# Patient Record
Sex: MALE | Race: WHITE | NOT HISPANIC OR LATINO | Employment: OTHER | URBAN - NONMETROPOLITAN AREA
[De-identification: names, ages, dates, MRNs, and addresses within clinical notes are randomized per-mention and may not be internally consistent; named-entity substitution may affect disease eponyms.]

---

## 2017-01-24 ENCOUNTER — TELEPHONE (OUTPATIENT)
Dept: CARDIOLOGY | Facility: CLINIC | Age: 67
End: 2017-01-24

## 2017-03-28 RX ORDER — SIMVASTATIN 10 MG
10 TABLET ORAL DAILY
Qty: 90 TABLET | Refills: 3 | Status: SHIPPED | OUTPATIENT
Start: 2017-03-28 | End: 2018-03-29 | Stop reason: SDUPTHER

## 2017-03-28 RX ORDER — SPIRONOLACTONE 25 MG/1
25 TABLET ORAL DAILY
Qty: 90 TABLET | Refills: 3 | Status: SHIPPED | OUTPATIENT
Start: 2017-03-28 | End: 2018-06-05 | Stop reason: SDUPTHER

## 2017-08-30 ENCOUNTER — OFFICE VISIT (OUTPATIENT)
Dept: CARDIOLOGY | Facility: CLINIC | Age: 67
End: 2017-08-30

## 2017-08-30 VITALS
BODY MASS INDEX: 22.29 KG/M2 | HEIGHT: 76 IN | WEIGHT: 183 LBS | DIASTOLIC BLOOD PRESSURE: 61 MMHG | SYSTOLIC BLOOD PRESSURE: 130 MMHG | HEART RATE: 64 BPM

## 2017-08-30 DIAGNOSIS — I10 ESSENTIAL HYPERTENSION: ICD-10-CM

## 2017-08-30 DIAGNOSIS — I42.9 CARDIOMYOPATHY (HCC): Primary | ICD-10-CM

## 2017-08-30 DIAGNOSIS — I50.22 CHRONIC SYSTOLIC HEART FAILURE (HCC): ICD-10-CM

## 2017-08-30 DIAGNOSIS — I25.10 CAD IN NATIVE ARTERY: ICD-10-CM

## 2017-08-30 PROCEDURE — 93000 ELECTROCARDIOGRAM COMPLETE: CPT | Performed by: INTERNAL MEDICINE

## 2017-08-30 PROCEDURE — 99213 OFFICE O/P EST LOW 20 MIN: CPT | Performed by: INTERNAL MEDICINE

## 2017-08-30 NOTE — PROGRESS NOTES
Subjective    Carloz Aldridge is a 66 y.o. male. Fu of ihd and cmo    History of Present Illness     [facility] CMO:  Feels good now and has good stamina and is active but has learned not to over do it. Never has to increase diuretic and has no pnd or orthopnea or edema. Last LVEF 30%. Had an AICD in the past that had to be removed and he does not want another. No light-headedness or palpitations    IHD:  Good stamina without any cp and tolerates meds well.    HFrEF:  Now Class 2c. He is pleased with his level of functioning and has had no decompensation over the past year      The following portions of the patient's history were reviewed and updated as appropriate: allergies, current medications, past family history, past medical history, past social history, past surgical history and problem list.    Patient Active Problem List   Diagnosis   • Cardiomyopathy   • CAD in native artery   • Hypertension   • Chronic systolic heart failure       No Known Allergies    Family History   Problem Relation Age of Onset   • Heart disease Mother    • Hypertension Mother    • Cancer Father    • Heart disease Father    • Hypertension Father        Social History     Social History   • Marital status:      Spouse name: N/A   • Number of children: N/A   • Years of education: N/A     Occupational History   • Not on file.     Social History Main Topics   • Smoking status: Current Every Day Smoker     Packs/day: 1.00   • Smokeless tobacco: Not on file      Comment: Has tried unsuccessfully in the past to quit   • Alcohol use No   • Drug use: No   • Sexual activity: Not on file     Other Topics Concern   • Not on file     Social History Narrative         Current Outpatient Prescriptions:   •  aspirin 81 MG tablet, Take  by mouth daily., Disp: , Rfl:   •  carvedilol (COREG) 6.25 MG tablet, Take 1 tablet by mouth 2 (Two) Times a Day., Disp: 180 tablet, Rfl: 3  •  Diphenhydramine-APAP, sleep, (TYLENOL PM EXTRA STRENGTH PO), Take  by  "mouth as needed., Disp: , Rfl:   •  lisinopril (PRINIVIL,ZESTRIL) 5 MG tablet, Take 1 tablet by mouth Daily., Disp: 90 tablet, Rfl: 3  •  mometasone (NASONEX) 50 MCG/ACT nasal spray, INSTILL 2 SPRAYS INTO EACH NOSTRIL TWICE A DAY, Disp: , Rfl: 2  •  nitroglycerin (NITROSTAT) 0.4 MG SL tablet, Place 0.4 mg under the tongue every 5 (five) minutes as needed for chest pain. Take no more than 3 doses in 15 minutes., Disp: , Rfl:   •  PROAIR RESPICLICK 108 (90 BASE) MCG/ACT inhaler, TAKE 2 PUFFS EVERY 4 HOURS, Disp: , Rfl: 5  •  rOPINIRole (REQUIP) 0.5 MG tablet, Take  by mouth. Take 1 hour before bedtime., Disp: , Rfl:   •  simvastatin (ZOCOR) 10 MG tablet, Take 1 tablet by mouth Daily., Disp: 90 tablet, Rfl: 3  •  SPIRIVA RESPIMAT 2.5 MCG/ACT aerosol solution, TAKE 2 PUFFS EVERY DAY, Disp: , Rfl: 3  •  spironolactone (ALDACTONE) 25 MG tablet, Take 1 tablet by mouth Daily., Disp: 90 tablet, Rfl: 3    Past Surgical History:   Procedure Laterality Date   • CARDIAC CATHETERIZATION Right    • CATARACT EXTRACTION Bilateral    • CERVICAL DISCECTOMY ANTERIOR      C6 and 7   • CORONARY STENT PLACEMENT     • EYE SURGERY     • HERNIA REPAIR      x2   • MOUTH SURGERY     • OTHER SURGICAL HISTORY      laser surgery   • TOE SURGERY      great toe   • TONSILLECTOMY      as chile   • VASECTOMY         Review of Systems   Respiratory: Negative for apnea, chest tightness and shortness of breath.    Cardiovascular: Negative for chest pain, palpitations and leg swelling.   Gastrointestinal: Negative for abdominal pain.   Genitourinary: Negative for dysuria.   Musculoskeletal: Negative for myalgias.   Neurological: Negative for weakness and light-headedness.   Psychiatric/Behavioral: Negative for sleep disturbance.       /61 (BP Location: Left arm, Patient Position: Sitting)  Pulse 64  Ht 76\" (193 cm)  Wt 183 lb (83 kg)  BMI 22.28 kg/m2  Procedures    Objective   Physical Exam   Constitutional: He is oriented to person, place, and " time. He appears well-developed and well-nourished.   HENT:   Head: Normocephalic.   Poor dentition   Eyes: Pupils are equal, round, and reactive to light.   Cardiovascular: Normal rate, regular rhythm and intact distal pulses.  Exam reveals distant heart sounds.    Pulmonary/Chest: Effort normal and breath sounds normal. No respiratory distress. He has no wheezes. He has no rales.   Abdominal: Soft. Bowel sounds are normal. He exhibits no distension. There is no tenderness.   Musculoskeletal: He exhibits no edema.   Neurological: He is alert and oriented to person, place, and time.   Skin: Skin is warm and dry.   Psychiatric: He has a normal mood and affect.       Assessment/Plan   Carloz was seen today for cardiomyopathy, coronary artery disease and hypertension.    Diagnoses and all orders for this visit:    Cardiomyopathy  Comments:  stable  Orders:  -     ECG 12 Lead    CAD in native artery  Comments:  no angina    Essential hypertension  Comments:  controlled    Chronic systolic heart failure  Comments:  class 2c and stable                 Return in about 1 year (around 8/30/2018).  Orders Placed This Encounter   Procedures   • ECG 12 Lead     Order Specific Question:   Reason for Exam:     Answer:   Cardiomyopathy

## 2017-10-30 RX ORDER — LISINOPRIL 5 MG/1
TABLET ORAL
Qty: 90 TABLET | Refills: 3 | Status: SHIPPED | OUTPATIENT
Start: 2017-10-30 | End: 2018-11-26 | Stop reason: SDUPTHER

## 2017-10-30 RX ORDER — CARVEDILOL 6.25 MG/1
TABLET ORAL
Qty: 180 TABLET | Refills: 3 | Status: SHIPPED | OUTPATIENT
Start: 2017-10-30 | End: 2018-12-19 | Stop reason: SDUPTHER

## 2018-03-29 RX ORDER — SIMVASTATIN 10 MG
10 TABLET ORAL DAILY
Qty: 90 TABLET | Refills: 1 | Status: SHIPPED | OUTPATIENT
Start: 2018-03-29 | End: 2018-09-27 | Stop reason: SDUPTHER

## 2018-06-05 RX ORDER — SPIRONOLACTONE 25 MG/1
25 TABLET ORAL DAILY
Qty: 90 TABLET | Refills: 0 | Status: SHIPPED | OUTPATIENT
Start: 2018-06-05 | End: 2018-09-02 | Stop reason: SDUPTHER

## 2018-09-04 ENCOUNTER — OFFICE VISIT (OUTPATIENT)
Dept: CARDIOLOGY | Facility: CLINIC | Age: 68
End: 2018-09-04

## 2018-09-04 VITALS
WEIGHT: 194 LBS | HEART RATE: 72 BPM | OXYGEN SATURATION: 98 % | SYSTOLIC BLOOD PRESSURE: 120 MMHG | DIASTOLIC BLOOD PRESSURE: 66 MMHG | HEIGHT: 76 IN | BODY MASS INDEX: 23.62 KG/M2

## 2018-09-04 DIAGNOSIS — I50.22 CHRONIC SYSTOLIC HEART FAILURE (HCC): ICD-10-CM

## 2018-09-04 DIAGNOSIS — I25.5 ISCHEMIC CARDIOMYOPATHY: Primary | ICD-10-CM

## 2018-09-04 DIAGNOSIS — I25.10 CAD IN NATIVE ARTERY: ICD-10-CM

## 2018-09-04 DIAGNOSIS — E78.49 OTHER HYPERLIPIDEMIA: ICD-10-CM

## 2018-09-04 DIAGNOSIS — I10 ESSENTIAL HYPERTENSION: ICD-10-CM

## 2018-09-04 DIAGNOSIS — I73.9 PAD (PERIPHERAL ARTERY DISEASE) (HCC): ICD-10-CM

## 2018-09-04 DIAGNOSIS — F17.200 TOBACCO USE DISORDER: ICD-10-CM

## 2018-09-04 PROCEDURE — 93000 ELECTROCARDIOGRAM COMPLETE: CPT | Performed by: INTERNAL MEDICINE

## 2018-09-04 PROCEDURE — 99214 OFFICE O/P EST MOD 30 MIN: CPT | Performed by: INTERNAL MEDICINE

## 2018-09-04 RX ORDER — CLOPIDOGREL BISULFATE 75 MG/1
75 TABLET ORAL DAILY
Qty: 30 TABLET | Refills: 11 | Status: SHIPPED | OUTPATIENT
Start: 2018-09-04 | End: 2019-06-03

## 2018-09-04 RX ORDER — SPIRONOLACTONE 25 MG/1
TABLET ORAL
Qty: 90 TABLET | Refills: 3 | Status: SHIPPED | OUTPATIENT
Start: 2018-09-04 | End: 2019-09-01 | Stop reason: SDUPTHER

## 2018-09-04 NOTE — PROGRESS NOTES
"Subjective    Carloz Aldridge is a 67 y.o. male. FU of chf and ihd and risks    History of Present Illness     HFrEF:  Has slowly declining stamina with WELCH as the limiter. Also has foot pain with walking and has noted a toenail \"dropping off\". Is compliant with meds that are well tolerated and EKG is nsc. Has no edema or unusual sob. Is CL 2C and his usual activities are well tolerated.    IHD:  Is active with yard work and housework and has no cp with this amount of activity. Has some lability in his bp and on 8/15/18 at 0900 had a spell of near syncope with BP \"50/40\" and has had these spells in the past. These spells resolve quickly with laying down and there are no sequela. He has not changed meds in the past and does not usually have any problems with them.    [facility] CMO:  LVEF is < 35% but he is not interested in an AICD - he had one in the past that had to be removed d/t infection.    TOBACCO USE:  He is not interested in quitting but understands the health risk and has been counseled extensively in the recent past    HLD:  Is on a potent statin at a low dose and has not been checked \"in a while\".    The following portions of the patient's history were reviewed and updated as appropriate: allergies, current medications, past family history, past medical history, past social history, past surgical history and problem list.    Patient Active Problem List   Diagnosis   • Cardiomyopathy (CMS/HCC)   • CAD in native artery   • Hypertension   • Chronic systolic heart failure (CMS/HCC)   • PAD (peripheral artery disease) (CMS/HCC)   • Other hyperlipidemia   • Tobacco use disorder       No Known Allergies    Family History   Problem Relation Age of Onset   • Heart disease Mother    • Hypertension Mother    • Cancer Father    • Heart disease Father    • Hypertension Father        Social History     Social History   • Marital status:      Spouse name: N/A   • Number of children: N/A   • Years of education: N/A "     Occupational History   • Not on file.     Social History Main Topics   • Smoking status: Current Every Day Smoker     Packs/day: 1.00   • Smokeless tobacco: Never Used      Comment: Has tried unsuccessfully in the past to quit   • Alcohol use No   • Drug use: No   • Sexual activity: Not on file     Other Topics Concern   • Not on file     Social History Narrative   • No narrative on file         Current Outpatient Prescriptions:   •  aspirin 81 MG tablet, Take  by mouth daily., Disp: , Rfl:   •  carvedilol (COREG) 6.25 MG tablet, TAKE 1 TABLET BY MOUTH TWICE A DAY, Disp: 180 tablet, Rfl: 3  •  lisinopril (PRINIVIL,ZESTRIL) 5 MG tablet, TAKE 1 TABLET BY MOUTH DAILY., Disp: 90 tablet, Rfl: 3  •  mometasone (NASONEX) 50 MCG/ACT nasal spray, INSTILL 2 SPRAYS INTO EACH NOSTRIL TWICE A DAY, Disp: , Rfl: 2  •  nitroglycerin (NITROSTAT) 0.4 MG SL tablet, Place 0.4 mg under the tongue every 5 (five) minutes as needed for chest pain. Take no more than 3 doses in 15 minutes., Disp: , Rfl:   •  PROAIR RESPICLICK 108 (90 BASE) MCG/ACT inhaler, TAKE 2 PUFFS EVERY 4 HOURS, Disp: , Rfl: 5  •  simvastatin (ZOCOR) 10 MG tablet, TAKE 1 TABLET BY MOUTH DAILY., Disp: 90 tablet, Rfl: 1  •  SPIRIVA RESPIMAT 2.5 MCG/ACT aerosol solution, TAKE 2 PUFFS EVERY DAY, Disp: , Rfl: 3  •  spironolactone (ALDACTONE) 25 MG tablet, TAKE 1 TABLET BY MOUTH DAILY., Disp: 90 tablet, Rfl: 0  •  clopidogrel (PLAVIX) 75 MG tablet, Take 1 tablet by mouth Daily., Disp: 30 tablet, Rfl: 11  •  Diphenhydramine-APAP, sleep, (TYLENOL PM EXTRA STRENGTH PO), Take  by mouth as needed., Disp: , Rfl:     Past Surgical History:   Procedure Laterality Date   • CARDIAC CATHETERIZATION Right    • CATARACT EXTRACTION Bilateral    • CERVICAL DISCECTOMY ANTERIOR      C6 and 7   • CORONARY STENT PLACEMENT     • EYE SURGERY     • HERNIA REPAIR      x2   • MOUTH SURGERY     • OTHER SURGICAL HISTORY      laser surgery   • TOE SURGERY      great toe   • TONSILLECTOMY      as  "chile   • VASECTOMY         Review of Systems   Constitutional: Negative for fatigue, fever and unexpected weight change.   Respiratory: Positive for shortness of breath. Negative for apnea and chest tightness.    Cardiovascular: Negative for chest pain, palpitations and leg swelling.   Gastrointestinal: Negative for abdominal pain.   Genitourinary: Negative for dysuria.   Musculoskeletal: Positive for arthralgias. Negative for myalgias.   Neurological: Negative for weakness and light-headedness.   Psychiatric/Behavioral: Negative for sleep disturbance.       /66 (BP Location: Left arm, Patient Position: Sitting, Cuff Size: Adult)   Pulse 72   Ht 193 cm (75.98\")   Wt 88 kg (194 lb)   SpO2 98%   BMI 23.62 kg/m²   Procedures    Objective   Physical Exam   Constitutional: He is oriented to person, place, and time. He appears well-developed and well-nourished. No distress.   HENT:   Head: Normocephalic.   Eyes: Pupils are equal, round, and reactive to light.   Neck: No thyromegaly present.   Cardiovascular: Normal rate and regular rhythm.  Exam reveals distant heart sounds. Exam reveals no gallop and no friction rub.    No murmur heard.  Pulses:       Dorsalis pedis pulses are 0 on the right side, and 0 on the left side.        Posterior tibial pulses are 0 on the right side, and 0 on the left side.   KLARISSA<0.75 BILAT   Pulmonary/Chest: Effort normal and breath sounds normal. No respiratory distress. He has no wheezes. He has no rales.   Abdominal: Soft. Bowel sounds are normal. He exhibits no distension. There is no tenderness. There is no guarding.   Musculoskeletal: He exhibits no edema or tenderness.   Neurological: He is alert and oriented to person, place, and time.   Skin: Skin is warm and dry. He is not diaphoretic.   Psychiatric: He has a normal mood and affect.       Assessment/Plan   Carloz was seen today for cardiomyopathy, edema, congestive heart failure, dizziness, fatigue and " hypotension.    Diagnoses and all orders for this visit:    Ischemic cardiomyopathy  Comments:  CL 2C CHF  - STABLE. REFUSES TO CONSIDER ANOTHER AICD    CAD in native artery  Comments:  NO ANGINA  Orders:  -     ECG 12 Lead    Essential hypertension  Comments:  LABILE READINGS WITH SOME SYMPTOMATIC HYPOTENSION    Chronic systolic heart failure (CMS/HCC)    PAD (peripheral artery disease) (CMS/HCC)  Comments:  BILAT FOOT PAIN WITH VERY LOW KLARISSA's BILAT - CHECK BILAT ART DUPLEX  Orders:  -     US Arterial Doppler Lower Extremity Complete  -     clopidogrel (PLAVIX) 75 MG tablet; Take 1 tablet by mouth Daily.    Other hyperlipidemia  Comments:  ON LOW DOSE POTENT STATIN AND HAS NOT BEEN CHECKED RECENTLY  Orders:  -     Lipid Panel; Future    Tobacco use disorder  Comments:  IS NOT INTERESTED IN QUITTING OR MORE COUNSELING AT THIS TIME                 Return in about 6 months (around 3/4/2019), or WITH [facility].  Orders Placed This Encounter   Procedures   • US Arterial Doppler Lower Extremity Complete     Order Specific Question:   Reason for Exam:     Answer:   CLAUDICATION   • Lipid Panel     Standing Status:   Future     Standing Expiration Date:   9/4/2019   • ECG 12 Lead     Order Specific Question:   Reason for Exam:     Answer:   CHF

## 2018-09-10 ENCOUNTER — HOSPITAL ENCOUNTER (OUTPATIENT)
Dept: ULTRASOUND IMAGING | Facility: HOSPITAL | Age: 68
Discharge: HOME | End: 2018-09-10
Attending: INTERNAL MEDICINE | Admitting: INTERNAL MEDICINE

## 2018-09-10 PROCEDURE — 93925 LOWER EXTREMITY STUDY: CPT | Performed by: SURGERY

## 2018-09-10 PROCEDURE — 93925 LOWER EXTREMITY STUDY: CPT

## 2018-09-28 RX ORDER — SIMVASTATIN 10 MG
10 TABLET ORAL DAILY
Qty: 90 TABLET | Refills: 3 | Status: SHIPPED | OUTPATIENT
Start: 2018-09-28 | End: 2019-06-06

## 2018-11-27 RX ORDER — LISINOPRIL 5 MG/1
5 TABLET ORAL DAILY
Qty: 90 TABLET | Refills: 3 | Status: SHIPPED | OUTPATIENT
Start: 2018-11-27 | End: 2019-12-01 | Stop reason: SDUPTHER

## 2018-12-19 RX ORDER — CARVEDILOL 6.25 MG/1
TABLET ORAL
Qty: 180 TABLET | Refills: 2 | Status: SHIPPED | OUTPATIENT
Start: 2018-12-19 | End: 2019-10-24 | Stop reason: SDUPTHER

## 2019-06-03 ENCOUNTER — OFFICE VISIT (OUTPATIENT)
Dept: CARDIOLOGY | Facility: CLINIC | Age: 69
End: 2019-06-03

## 2019-06-03 VITALS
WEIGHT: 188 LBS | HEIGHT: 75 IN | SYSTOLIC BLOOD PRESSURE: 121 MMHG | BODY MASS INDEX: 23.38 KG/M2 | DIASTOLIC BLOOD PRESSURE: 60 MMHG | HEART RATE: 72 BPM

## 2019-06-03 DIAGNOSIS — E78.49 OTHER HYPERLIPIDEMIA: ICD-10-CM

## 2019-06-03 DIAGNOSIS — I25.10 CAD IN NATIVE ARTERY: ICD-10-CM

## 2019-06-03 DIAGNOSIS — F17.200 TOBACCO USE DISORDER: ICD-10-CM

## 2019-06-03 DIAGNOSIS — I25.5 ISCHEMIC CARDIOMYOPATHY: ICD-10-CM

## 2019-06-03 DIAGNOSIS — I10 ESSENTIAL HYPERTENSION: ICD-10-CM

## 2019-06-03 DIAGNOSIS — I50.22 CHRONIC SYSTOLIC HEART FAILURE (HCC): Primary | ICD-10-CM

## 2019-06-03 PROCEDURE — 93000 ELECTROCARDIOGRAM COMPLETE: CPT | Performed by: NURSE PRACTITIONER

## 2019-06-03 PROCEDURE — 99406 BEHAV CHNG SMOKING 3-10 MIN: CPT | Performed by: NURSE PRACTITIONER

## 2019-06-03 PROCEDURE — 99214 OFFICE O/P EST MOD 30 MIN: CPT | Performed by: NURSE PRACTITIONER

## 2019-06-03 NOTE — PROGRESS NOTES
Subjective:     Encounter Date:06/03/2019      Patient ID: Carloz Aldridge is a 68 y.o. male.    Chief Complaint:  Coronary Artery Disease   Presents for follow-up visit. Symptoms include leg swelling (occasional ) and shortness of breath. Pertinent negatives include no chest pain, chest pressure, chest tightness, dizziness, muscle weakness, palpitations or weight gain. His past medical history is significant for CHF. The symptoms have been stable. Compliance with diet is good. Compliance with exercise is good. Compliance with medications is good.   Congestive Heart Failure   Presents for follow-up visit. Associated symptoms include shortness of breath. Pertinent negatives include no abdominal pain, chest pain, chest pressure, claudication, edema, fatigue, muscle weakness, near-syncope or palpitations. The symptoms have been stable. His past medical history is significant for CAD. Compliance with total regimen is %. Compliance with diet is 51-75%. Compliance with exercise is 51-75%. Compliance with medications is %.     Patient is here for routine follow for ischemic cardiomyopathy, systolic congestive heart failure, coronary artery disease with remote history of MI, tobacco use and hyperlipidemia. Patient also has a history of AICD infection that was removed. Patient has no interest in having an additional AICD placed- we discussed benefits and reason for AICD and he acknowledges understanding. Patient continues to smoke 1 ppd but is down from 3.5 ppd. Patient follows with Dr. Chakraborty as his PCP. He states he is active without limitation. He does have some chronic shortness of breath- but unchanged. Denies chest pain. Occasional swelling on right leg- comes and goes. Patient at last OV had ultrasound of legs and was started on Plavix- however ultrasound of leg was normal. He states he could not tolerate Plavix so he quit taking.   The following portions of the patient's history were reviewed and  updated as appropriate: allergies, current medications, past family history, past medical history, past social history, past surgical history and problem list.   Prior to Admission medications    Medication Sig Start Date End Date Taking? Authorizing Provider   aspirin 81 MG tablet Take  by mouth daily.   Yes Alvaro Alvarado MD   carvedilol (COREG) 6.25 MG tablet TAKE 1 TABLET BY MOUTH TWICE A DAY 12/19/18  Yes Hector Canales MD   Diphenhydramine-APAP, sleep, (TYLENOL PM EXTRA STRENGTH PO) Take  by mouth as needed.   Yes Alvaro Alvarado MD   lisinopril (PRINIVIL,ZESTRIL) 5 MG tablet Take 1 tablet by mouth Daily. 11/27/18  Yes Hector Canales MD   mometasone (NASONEX) 50 MCG/ACT nasal spray INSTILL 2 SPRAYS INTO EACH NOSTRIL TWICE A DAY 8/13/16  Yes Alvaro Alvarado MD   nitroglycerin (NITROSTAT) 0.4 MG SL tablet Place 0.4 mg under the tongue every 5 (five) minutes as needed for chest pain. Take no more than 3 doses in 15 minutes.   Yes Alvaro Alvarado MD   PROAIR RESPICLICK 108 (90 BASE) MCG/ACT inhaler TAKE 2 PUFFS EVERY 4 HOURS 8/13/16  Yes Alvaro Alvarado MD   simvastatin (ZOCOR) 10 MG tablet TAKE 1 TABLET BY MOUTH DAILY. 9/28/18  Yes Hector Canales MD   SPIRIVA RESPIMAT 2.5 MCG/ACT aerosol solution TAKE 2 PUFFS EVERY DAY 8/13/16  Yes Alvaro Alvarado MD   spironolactone (ALDACTONE) 25 MG tablet TAKE 1 TABLET BY MOUTH EVERY DAY 9/4/18  Yes Hector Canales MD   clopidogrel (PLAVIX) 75 MG tablet Take 1 tablet by mouth Daily. 9/4/18 6/3/19 Yes Hector Canales MD     Past Medical History:   Diagnosis Date   • CAD in native artery    • Cardiomyopathy (CMS/HCC)     Story: 9/14- EF 20-25% WORSENED LVEF AFTER COMING OFF MEDS (SELF-DIRECTED) 2/14-LVEF BELOW 35%; 4/2015 EF 30% Pt had an AICD in the past, which was removed due to infection.   • Chronic systolic heart failure (CMS/HCC)     Story: LVEF 30% AICD removed 2011 d/t infection. Per   Ally risk of infection too high to reimplant AICD.   • COPD (chronic obstructive pulmonary disease) (CMS/[facility])    • Dyspnea, paroxysmal nocturnal    • Erectile dysfunction    • Hyperlipidemia, mild    • Hypertension    • Non compliance with medical treatment    • Tobacco use disorder    • V tach (CMS/[facility])        Review of Systems   Constitution: Negative for chills, decreased appetite, fatigue, fever, malaise/fatigue, weight gain and weight loss.   HENT: Negative for nosebleeds.    Eyes: Negative for visual disturbance.   Cardiovascular: Positive for leg swelling (occasional ). Negative for chest pain, claudication, dyspnea on exertion, near-syncope, orthopnea, palpitations, paroxysmal nocturnal dyspnea and syncope.   Respiratory: Positive for shortness of breath. Negative for chest tightness, cough, hemoptysis and snoring.    Endocrine: Negative for cold intolerance and heat intolerance.   Hematologic/Lymphatic: Negative for bleeding problem. Does not bruise/bleed easily.   Skin: Negative for rash.   Musculoskeletal: Negative for back pain, falls and muscle weakness.   Gastrointestinal: Negative for abdominal pain, constipation, diarrhea, heartburn, melena, nausea and vomiting.   Genitourinary: Negative for hematuria.   Neurological: Negative for dizziness, headaches and light-headedness.   Psychiatric/Behavioral: Negative for altered mental status.   Allergic/Immunologic: Negative for persistent infections.         ECG 12 Lead  Date/Time: 6/3/2019 2:15 PM  Performed by: Bradford Camargo APRN  Authorized by: Bradford Camargo APRN   Comparison: compared with previous ECG from 9/4/2018  Similar to previous ECG  Rhythm: sinus rhythm  Conduction: 1st degree AV block and non-specific intraventricular conduction delay               Objective:     Physical Exam   Constitutional: He is oriented to person, place, and time. He appears well-developed and well-nourished.   HENT:   Head: Normocephalic and atraumatic.   Eyes:  "Pupils are equal, round, and reactive to light.   Neck: Normal range of motion. Neck supple. No JVD present. Carotid bruit is not present.   Cardiovascular: Normal rate, regular rhythm, normal heart sounds and intact distal pulses.   Pulmonary/Chest: Effort normal. He has wheezes.   Abdominal: Soft. Bowel sounds are normal.   Musculoskeletal: Normal range of motion. He exhibits no edema.   Neurological: He is alert and oriented to person, place, and time. He has normal reflexes.   Skin: Skin is warm and dry.   Psychiatric: He has a normal mood and affect. His behavior is normal. Judgment and thought content normal.     Blood pressure 121/60, pulse 72, height 190.5 cm (75\"), weight 85.3 kg (188 lb).      Lab Review:       Assessment:          Diagnosis Plan   1. Chronic systolic heart failure (CMS/HCC)     2. Ischemic cardiomyopathy     3. CAD in native artery     4. Essential hypertension     5. Other hyperlipidemia     6. Tobacco use disorder            Plan:       1. Chronic systolic congestive heart failure- last EF 30% on echo in 2015. Class II symptoms. ACE and Beta Blocker. Reports blood pressure runs too low at home. Low Salt Diet. Daily Weights.   2. Ischemic Cardiomyopathy- EF 30%- long history of reduced EF. AICD removed due to infection. Patient does not want AICD  3. Coronary Artery Disease- history of MI and stent. On statin, aspirin, BB and ACe. No clinical evidence of ischemia  4. BLood Pressure well controlled- reports normally runs systolic 100  5. Hyperlipidemia- on statin followed by PCP. Obtain last lipid panel from PCP  6. I advised Carloz of the risks of continuing to use tobacco, and I provided him with tobacco cessation educational materials in the After Visit Summary. Down from 3.5 ppd to 1 ppd    During this visit, I spent 3-5 minutes counseling the patient regarding tobacco cessation.         "

## 2019-06-06 ENCOUNTER — TELEPHONE (OUTPATIENT)
Dept: CARDIOLOGY | Facility: CLINIC | Age: 69
End: 2019-06-06

## 2019-06-06 RX ORDER — ATORVASTATIN CALCIUM 40 MG/1
40 TABLET, FILM COATED ORAL DAILY
Qty: 30 TABLET | Refills: 11 | Status: SHIPPED | OUTPATIENT
Start: 2019-06-06 | End: 2020-06-01

## 2019-06-06 NOTE — TELEPHONE ENCOUNTER
PATIENT HAS BEEN NOTIFIED. HE WAS CONFUSED SOME THINKING HE WAS ALREADY ON LIPITOR. I TOLD HIM OUR RECORDS SHOW HE WAS ON ZOCOR. HE VERBALIZED UNDERSTANDING AT THE END OF THE PHONE CALL.  ----- Message from SHILPA William sent at 6/6/2019  3:25 PM CDT -----  Please let patient know cholesterol is high and not at goal. Stop his statin and I am starting Lipitor.   ----- Message -----  From: Sheree Mujica MA  Sent: 6/5/2019   4:12 PM  To: SHILPA William    SCANNED UNDER MEDIA  ----- Message -----  From: Bradford Camargo APRN  Sent: 6/3/2019   2:15 PM  To: Sheree Mujica MA    Please obtain last lipid panel from Dr. Mylene diaz

## 2019-09-03 RX ORDER — SPIRONOLACTONE 25 MG/1
TABLET ORAL
Qty: 90 TABLET | Refills: 3 | Status: SHIPPED | OUTPATIENT
Start: 2019-09-03 | End: 2020-10-05

## 2019-10-24 RX ORDER — CARVEDILOL 6.25 MG/1
TABLET ORAL
Qty: 180 TABLET | Refills: 2 | Status: SHIPPED | OUTPATIENT
Start: 2019-10-24 | End: 2020-12-22 | Stop reason: SDUPTHER

## 2019-12-02 RX ORDER — LISINOPRIL 5 MG/1
TABLET ORAL
Qty: 90 TABLET | Refills: 3 | Status: SHIPPED | OUTPATIENT
Start: 2019-12-02 | End: 2020-12-22

## 2020-01-15 ENCOUNTER — OFFICE VISIT (OUTPATIENT)
Dept: CARDIOLOGY | Facility: CLINIC | Age: 70
End: 2020-01-15

## 2020-01-15 VITALS
HEART RATE: 81 BPM | SYSTOLIC BLOOD PRESSURE: 130 MMHG | HEIGHT: 75 IN | OXYGEN SATURATION: 99 % | BODY MASS INDEX: 23.25 KG/M2 | WEIGHT: 187 LBS | DIASTOLIC BLOOD PRESSURE: 82 MMHG

## 2020-01-15 DIAGNOSIS — I10 ESSENTIAL HYPERTENSION: ICD-10-CM

## 2020-01-15 DIAGNOSIS — E78.49 OTHER HYPERLIPIDEMIA: ICD-10-CM

## 2020-01-15 DIAGNOSIS — I50.22 CHRONIC SYSTOLIC HEART FAILURE (HCC): ICD-10-CM

## 2020-01-15 DIAGNOSIS — I35.1 AORTIC VALVE INSUFFICIENCY, ETIOLOGY OF CARDIAC VALVE DISEASE UNSPECIFIED: ICD-10-CM

## 2020-01-15 DIAGNOSIS — I25.10 CORONARY ARTERY DISEASE INVOLVING NATIVE CORONARY ARTERY OF NATIVE HEART WITHOUT ANGINA PECTORIS: ICD-10-CM

## 2020-01-15 DIAGNOSIS — I42.0 CARDIOMYOPATHY, DILATED (HCC): Primary | ICD-10-CM

## 2020-01-15 DIAGNOSIS — F17.200 TOBACCO USE DISORDER: ICD-10-CM

## 2020-01-15 PROCEDURE — 93000 ELECTROCARDIOGRAM COMPLETE: CPT | Performed by: NURSE PRACTITIONER

## 2020-01-15 PROCEDURE — 99214 OFFICE O/P EST MOD 30 MIN: CPT | Performed by: NURSE PRACTITIONER

## 2020-01-20 ENCOUNTER — APPOINTMENT (OUTPATIENT)
Dept: CARDIOLOGY | Facility: HOSPITAL | Age: 70
End: 2020-01-20

## 2020-01-28 ENCOUNTER — HOSPITAL ENCOUNTER (OUTPATIENT)
Dept: CARDIOLOGY | Facility: HOSPITAL | Age: 70
Discharge: HOME | End: 2020-01-28
Admitting: NURSE PRACTITIONER

## 2020-01-28 VITALS
WEIGHT: 186.95 LBS | DIASTOLIC BLOOD PRESSURE: 63 MMHG | BODY MASS INDEX: 23.24 KG/M2 | SYSTOLIC BLOOD PRESSURE: 128 MMHG | HEIGHT: 75 IN

## 2020-01-28 DIAGNOSIS — I35.1 AORTIC VALVE INSUFFICIENCY, ETIOLOGY OF CARDIAC VALVE DISEASE UNSPECIFIED: ICD-10-CM

## 2020-01-28 DIAGNOSIS — I42.0 CARDIOMYOPATHY, DILATED (HCC): ICD-10-CM

## 2020-01-28 PROCEDURE — 93356 MYOCRD STRAIN IMG SPCKL TRCK: CPT

## 2020-01-28 PROCEDURE — 93356 MYOCRD STRAIN IMG SPCKL TRCK: CPT | Performed by: INTERNAL MEDICINE

## 2020-01-28 PROCEDURE — 93306 TTE W/DOPPLER COMPLETE: CPT | Performed by: INTERNAL MEDICINE

## 2020-01-28 PROCEDURE — 93306 TTE W/DOPPLER COMPLETE: CPT

## 2020-01-29 LAB
BH CV ECHO MEAS - AO MAX PG (FULL): 5.3 MMHG
BH CV ECHO MEAS - AO MAX PG: 9.9 MMHG
BH CV ECHO MEAS - AO MEAN PG (FULL): 3 MMHG
BH CV ECHO MEAS - AO MEAN PG: 5 MMHG
BH CV ECHO MEAS - AO ROOT AREA (BSA CORRECTED): 1.7
BH CV ECHO MEAS - AO ROOT AREA: 10.2 CM^2
BH CV ECHO MEAS - AO ROOT DIAM: 3.6 CM
BH CV ECHO MEAS - AO V2 MAX: 157 CM/SEC
BH CV ECHO MEAS - AO V2 MEAN: 97.1 CM/SEC
BH CV ECHO MEAS - AO V2 VTI: 31 CM
BH CV ECHO MEAS - AVA(I,A): 3.6 CM^2
BH CV ECHO MEAS - AVA(I,D): 3.6 CM^2
BH CV ECHO MEAS - AVA(V,A): 3.3 CM^2
BH CV ECHO MEAS - AVA(V,D): 3.3 CM^2
BH CV ECHO MEAS - BSA(HAYCOCK): 2.1 M^2
BH CV ECHO MEAS - BSA: 2.1 M^2
BH CV ECHO MEAS - BZI_BMI: 23.4 KILOGRAMS/M^2
BH CV ECHO MEAS - BZI_METRIC_HEIGHT: 190.5 CM
BH CV ECHO MEAS - BZI_METRIC_WEIGHT: 84.8 KG
BH CV ECHO MEAS - EDV(CUBED): 428.7 ML
BH CV ECHO MEAS - EDV(MOD-SP4): 217 ML
BH CV ECHO MEAS - EDV(TEICH): 301.9 ML
BH CV ECHO MEAS - EF(CUBED): 37.4 %
BH CV ECHO MEAS - EF(MOD-SP4): 32.7 %
BH CV ECHO MEAS - EF(TEICH): 29.7 %
BH CV ECHO MEAS - ESV(CUBED): 268.3 ML
BH CV ECHO MEAS - ESV(MOD-SP4): 146 ML
BH CV ECHO MEAS - ESV(TEICH): 212.2 ML
BH CV ECHO MEAS - FS: 14.5 %
BH CV ECHO MEAS - IVS/LVPW: 1
BH CV ECHO MEAS - IVSD: 0.97 CM
BH CV ECHO MEAS - LA DIMENSION: 4.5 CM
BH CV ECHO MEAS - LA/AO: 1.3
BH CV ECHO MEAS - LAT PEAK E' VEL: 3.5 CM/SEC
BH CV ECHO MEAS - LV DIASTOLIC VOL/BSA (35-75): 101.8 ML/M^2
BH CV ECHO MEAS - LV MASS(C)D: 343.2 GRAMS
BH CV ECHO MEAS - LV MASS(C)DI: 160.9 GRAMS/M^2
BH CV ECHO MEAS - LV MAX PG: 4.6 MMHG
BH CV ECHO MEAS - LV MEAN PG: 2 MMHG
BH CV ECHO MEAS - LV SYSTOLIC VOL/BSA (12-30): 68.5 ML/M^2
BH CV ECHO MEAS - LV V1 MAX: 107 CM/SEC
BH CV ECHO MEAS - LV V1 MEAN: 70.6 CM/SEC
BH CV ECHO MEAS - LV V1 VTI: 22.6 CM
BH CV ECHO MEAS - LVIDD: 7.5 CM
BH CV ECHO MEAS - LVIDS: 6.5 CM
BH CV ECHO MEAS - LVLD AP4: 10.6 CM
BH CV ECHO MEAS - LVLS AP4: 9.9 CM
BH CV ECHO MEAS - LVOT AREA (M): 4.9 CM^2
BH CV ECHO MEAS - LVOT AREA: 4.9 CM^2
BH CV ECHO MEAS - LVOT DIAM: 2.5 CM
BH CV ECHO MEAS - LVPWD: 0.93 CM
BH CV ECHO MEAS - MED PEAK E' VEL: 2.61 CM/SEC
BH CV ECHO MEAS - MV A MAX VEL: 126 CM/SEC
BH CV ECHO MEAS - MV DEC TIME: 0.14 SEC
BH CV ECHO MEAS - MV E MAX VEL: 109 CM/SEC
BH CV ECHO MEAS - MV E/A: 0.87
BH CV ECHO MEAS - SI(AO): 148 ML/M^2
BH CV ECHO MEAS - SI(CUBED): 75.2 ML/M^2
BH CV ECHO MEAS - SI(LVOT): 52 ML/M^2
BH CV ECHO MEAS - SI(MOD-SP4): 33.3 ML/M^2
BH CV ECHO MEAS - SI(TEICH): 42 ML/M^2
BH CV ECHO MEAS - SV(AO): 315.5 ML
BH CV ECHO MEAS - SV(CUBED): 160.3 ML
BH CV ECHO MEAS - SV(LVOT): 110.9 ML
BH CV ECHO MEAS - SV(MOD-SP4): 71 ML
BH CV ECHO MEAS - SV(TEICH): 89.6 ML
BH CV ECHO MEASUREMENTS AVERAGE E/E' RATIO: 35.68
LEFT ATRIUM VOLUME INDEX: 35.9 ML/M2
LEFT ATRIUM VOLUME: 76.5 CM3
MAXIMAL PREDICTED HEART RATE: 153 BPM
STRESS TARGET HR: 130 BPM

## 2020-06-01 RX ORDER — ATORVASTATIN CALCIUM 40 MG/1
TABLET, FILM COATED ORAL
Qty: 90 TABLET | Refills: 3 | Status: SHIPPED | OUTPATIENT
Start: 2020-06-01 | End: 2021-10-08 | Stop reason: SDUPTHER

## 2020-10-05 RX ORDER — SPIRONOLACTONE 25 MG/1
TABLET ORAL
Qty: 90 TABLET | Refills: 3 | Status: SHIPPED | OUTPATIENT
Start: 2020-10-05 | End: 2021-12-08

## 2020-10-21 NOTE — PROGRESS NOTES
"    Subjective:     Encounter Date:01/15/2020      Patient ID: Carloz Aldridge is a 69 y.o. male.    Chief Complaint: follow up CAD and CHF   The patient presents today to follow up up regarding his chronic systolic congestive heart failure/ischemic cardiomyopathy and CAD with history of remote MI. He previously had an ICD. However, this was explanted due to infection and he declined reimplantation. He continues to smoke and has COPD. He reports chronic and unchanged dyspnea on exertion and orthopnea. He states he has slept in his recliner for years, partially due to orthopnea and partially due to back pain. He states he has occasional swelling in hs right foot, but denies edema otherwise. He was previously placed on plavix for presumed PAD based on abnormal ABIs. However his bilateral lower extremity duplex was negative for any significant arterial stenosis and he quit taking plavix. He denies chest pain, PND, palpitations, syncope or pre syncope. He reports he is compliant with his medications with the exception of often missing his morning dose of coreg. He states his SBP is typically in the 110s.       The following portions of the patient's history were reviewed and updated as appropriate: allergies, current medications, past family history, past medical history, past social history, past surgical history and problem list.  /82   Pulse 81   Ht 190.5 cm (75\")   Wt 84.8 kg (187 lb)   SpO2 99%   BMI 23.37 kg/m²   No Known Allergies    Current Outpatient Medications:   •  aspirin 81 MG tablet, Take  by mouth daily., Disp: , Rfl:   •  atorvastatin (LIPITOR) 40 MG tablet, Take 1 tablet by mouth Daily., Disp: 30 tablet, Rfl: 11  •  carvedilol (COREG) 6.25 MG tablet, TAKE 1 TABLET BY MOUTH TWICE A DAY, Disp: 180 tablet, Rfl: 2  •  Diphenhydramine-APAP, sleep, (TYLENOL PM EXTRA STRENGTH PO), Take  by mouth as needed., Disp: , Rfl:   •  lisinopril (PRINIVIL,ZESTRIL) 5 MG tablet, TAKE 1 TABLET BY MOUTH EVERY " I spoke with pt. appt scheduled.    DAY, Disp: 90 tablet, Rfl: 3  •  mometasone (NASONEX) 50 MCG/ACT nasal spray, INSTILL 2 SPRAYS INTO EACH NOSTRIL TWICE A DAY, Disp: , Rfl: 2  •  nitroglycerin (NITROSTAT) 0.4 MG SL tablet, Place 0.4 mg under the tongue every 5 (five) minutes as needed for chest pain. Take no more than 3 doses in 15 minutes., Disp: , Rfl:   •  PROAIR RESPICLICK 108 (90 BASE) MCG/ACT inhaler, TAKE 2 PUFFS EVERY 4 HOURS, Disp: , Rfl: 5  •  SPIRIVA RESPIMAT 2.5 MCG/ACT aerosol solution, TAKE 2 PUFFS EVERY DAY, Disp: , Rfl: 3  •  spironolactone (ALDACTONE) 25 MG tablet, TAKE 1 TABLET BY MOUTH EVERY DAY, Disp: 90 tablet, Rfl: 3  Past Medical History:   Diagnosis Date   • CAD in native artery    • Cardiomyopathy (CMS/HCC)     Story: 9/14- EF 20-25% WORSENED LVEF AFTER COMING OFF MEDS (SELF-DIRECTED) 2/14-LVEF BELOW 35%; 4/2015 EF 30% Pt had an AICD in the past, which was removed due to infection.   • Chronic systolic heart failure (CMS/HCC)     Story: LVEF 30% AICD removed 2011 d/t infection. Per Dr. Canales risk of infection too high to reimplant AICD.   • COPD (chronic obstructive pulmonary disease) (CMS/HCC)    • Dyspnea, paroxysmal nocturnal    • Erectile dysfunction    • Hyperlipidemia, mild    • Hypertension    • Non compliance with medical treatment    • Tobacco use disorder    • V tach (CMS/HCC)      Past Surgical History:   Procedure Laterality Date   • CARDIAC CATHETERIZATION Right    • CATARACT EXTRACTION Bilateral    • CERVICAL DISCECTOMY ANTERIOR      C6 and 7   • CORONARY STENT PLACEMENT     • EYE SURGERY     • HERNIA REPAIR      x2   • MOUTH SURGERY     • OTHER SURGICAL HISTORY      laser surgery   • TOE SURGERY      great toe   • TONSILLECTOMY      as chile   • VASECTOMY       Social History     Socioeconomic History   • Marital status:      Spouse name: Not on file   • Number of children: Not on file   • Years of education: Not on file   • Highest education level: Not on file   Tobacco Use   • Smoking status:  Current Every Day Smoker     Packs/day: 1.00   • Smokeless tobacco: Never Used   • Tobacco comment: Has tried unsuccessfully in the past to quit   Substance and Sexual Activity   • Alcohol use: No   • Drug use: No   • Sexual activity: Defer     Family History   Problem Relation Age of Onset   • Heart disease Mother    • Hypertension Mother    • Cancer Father    • Heart disease Father    • Hypertension Father        Review of Systems   Constitution: Negative for chills, diaphoresis and fever.   HENT: Negative for nosebleeds.    Eyes: Negative for visual disturbance.   Cardiovascular: Positive for dyspnea on exertion and orthopnea. Negative for chest pain, claudication, cyanosis, irregular heartbeat, leg swelling, near-syncope, palpitations, paroxysmal nocturnal dyspnea and syncope.   Respiratory: Negative for cough, hemoptysis, shortness of breath, sputum production and wheezing.    Hematologic/Lymphatic: Negative for bleeding problem.   Skin: Negative for color change and flushing.   Musculoskeletal: Negative for falls.   Gastrointestinal: Negative for bloating, abdominal pain, hematemesis, hematochezia, melena, nausea and vomiting.   Genitourinary: Negative for hematuria.   Neurological: Negative for dizziness, light-headedness and weakness.   Psychiatric/Behavioral: Negative for altered mental status.         ECG 12 Lead  Date/Time: 1/15/2020 4:12 PM  Performed by: Camille Castro APRN  Authorized by: Camille Castro APRN   Comparison: compared with previous ECG from 6/3/2019  Similar to previous ECG  Rhythm: sinus rhythm  BPM: 81  Conduction: right bundle branch block and 1st degree AV block               Objective:     Physical Exam   Constitutional: He is oriented to person, place, and time. He appears well-developed and well-nourished. No distress.   HENT:   Head: Normocephalic and atraumatic.   Eyes: Pupils are equal, round, and reactive to light.   Neck: Normal range of motion. Neck supple. No JVD present. No  thyromegaly present.   Cardiovascular: Normal rate, regular rhythm, normal heart sounds and intact distal pulses. Exam reveals no gallop and no friction rub.   No murmur heard.  Distant heart tones    Pulmonary/Chest: Effort normal. No respiratory distress. He has decreased breath sounds. He has no wheezes. He has rhonchi. He has no rales. He exhibits no tenderness.   Abdominal: Soft. Bowel sounds are normal. He exhibits no distension. There is no tenderness.   Musculoskeletal: Normal range of motion. He exhibits no edema.   Neurological: He is alert and oriented to person, place, and time. No cranial nerve deficit.   Skin: Skin is warm and dry. He is not diaphoretic.   Psychiatric: He has a normal mood and affect. His behavior is normal.           Assessment:          Diagnosis Plan   1. Cardiomyopathy, dilated (CMS/HCC)    Ischemic    2. Aortic valve insufficiency, etiology of cardiac valve disease unspecified    Moderate AI per echo almost 5 years ago - repeat echo for continued monitoring  Stable clinically      3. Chronic systolic heart failure (CMS/HCC)    Stage C, Class II-III  LVEF 30% by 4/2015 echo  See notes in HPI - he declines reimplantation of an ICD  Compensated/euvolemic      4. Essential hypertension    Controlled    5. Other hyperlipidemia    Continue high intensity statin - he was transitioned from zocor to Lipitor 6 months ago because his LDL was 88 - not at goal - will request a more recent lipid panel from pcp      6. Tobacco use disorder  Carloz Aldridge  reports that he has been smoking. He has been smoking about 1.00 pack per day. He has never used smokeless tobacco.. I have educated him on the risk of diseases from using tobacco products such as cancer, COPD and heart diease.     I advised him to quit and he is willing to quit. We have discussed the following method/s for tobacco cessation:  Counseling.      I spent 4 minutes counseling the patient.          7. CAD - Hx of remote MI s/p PCI  per pt report. Stable, no angina. Will request previous cath report      Plan:       As noted above  2d echo   Continue ASA, atorvastatin, coreg, lisinopril, aldactone PRN SL NTG  Follow up 6 months, sooner with symptoms or concerns     Reviewed signs and symptoms of CHF and what to report with the patient. Patient instructed to restrict sodium and weigh daily. Report weight gain of greater than 2 lbs overnight or 5 lbs in 1 week. Pt verbalized understanding of instructions and plan of care.

## 2020-12-22 RX ORDER — CARVEDILOL 6.25 MG/1
6.25 TABLET ORAL 2 TIMES DAILY
Qty: 180 TABLET | Refills: 4 | Status: SHIPPED | OUTPATIENT
Start: 2020-12-22 | End: 2022-07-06

## 2020-12-22 RX ORDER — LISINOPRIL 5 MG/1
TABLET ORAL
Qty: 90 TABLET | Refills: 3 | Status: SHIPPED | OUTPATIENT
Start: 2020-12-22 | End: 2022-02-14

## 2021-06-11 RX ORDER — ATORVASTATIN CALCIUM 40 MG/1
TABLET, FILM COATED ORAL
Qty: 90 TABLET | Refills: 3 | OUTPATIENT
Start: 2021-06-11

## 2021-10-07 ENCOUNTER — TELEPHONE (OUTPATIENT)
Dept: CARDIOLOGY | Facility: CLINIC | Age: 71
End: 2021-10-07

## 2021-10-08 ENCOUNTER — OFFICE VISIT (OUTPATIENT)
Dept: CARDIOLOGY | Facility: CLINIC | Age: 71
End: 2021-10-08

## 2021-10-08 VITALS
HEIGHT: 75 IN | BODY MASS INDEX: 22.46 KG/M2 | DIASTOLIC BLOOD PRESSURE: 60 MMHG | SYSTOLIC BLOOD PRESSURE: 106 MMHG | WEIGHT: 180.6 LBS | OXYGEN SATURATION: 96 % | HEART RATE: 75 BPM

## 2021-10-08 DIAGNOSIS — I50.22 CHRONIC SYSTOLIC HEART FAILURE (HCC): ICD-10-CM

## 2021-10-08 DIAGNOSIS — I10 PRIMARY HYPERTENSION: ICD-10-CM

## 2021-10-08 DIAGNOSIS — E78.49 OTHER HYPERLIPIDEMIA: ICD-10-CM

## 2021-10-08 DIAGNOSIS — I35.1 NONRHEUMATIC AORTIC VALVE INSUFFICIENCY: ICD-10-CM

## 2021-10-08 DIAGNOSIS — J44.9 CHRONIC OBSTRUCTIVE PULMONARY DISEASE, UNSPECIFIED COPD TYPE (HCC): ICD-10-CM

## 2021-10-08 DIAGNOSIS — I25.10 CAD IN NATIVE ARTERY: Primary | ICD-10-CM

## 2021-10-08 DIAGNOSIS — F17.200 TOBACCO USE DISORDER: ICD-10-CM

## 2021-10-08 PROCEDURE — 93000 ELECTROCARDIOGRAM COMPLETE: CPT | Performed by: NURSE PRACTITIONER

## 2021-10-08 PROCEDURE — 99214 OFFICE O/P EST MOD 30 MIN: CPT | Performed by: NURSE PRACTITIONER

## 2021-10-08 RX ORDER — NITROGLYCERIN 0.4 MG/1
0.4 TABLET SUBLINGUAL
Qty: 25 TABLET | Refills: 2 | Status: SHIPPED | OUTPATIENT
Start: 2021-10-08

## 2021-10-08 RX ORDER — BUDESONIDE, GLYCOPYRROLATE, AND FORMOTEROL FUMARATE 160; 9; 4.8 UG/1; UG/1; UG/1
2 AEROSOL, METERED RESPIRATORY (INHALATION) 2 TIMES DAILY
COMMUNITY

## 2021-10-08 RX ORDER — ATORVASTATIN CALCIUM 40 MG/1
40 TABLET, FILM COATED ORAL DAILY
Qty: 90 TABLET | Refills: 3 | Status: SHIPPED | OUTPATIENT
Start: 2021-10-08

## 2021-10-08 NOTE — PATIENT INSTRUCTIONS
Advance Care Planning and Advance Directives     You make decisions on a daily basis - decisions about where you want to live, your career, your home, your life. Perhaps one of the most important decisions you face is your choice for future medical care. Take time to talk with your family and your healthcare team and start planning today.  Advance Care Planning is a process that can help you:  · Understand possible future healthcare decisions in light of your own experiences  · Reflect on those decision in light of your goals and values  · Discuss your decisions with those closest to you and the healthcare professionals that care for you  · Make a plan by creating a document that reflects your wishes    Surrogate Decision Maker  In the event of a medical emergency, which has left you unable to communicate or to make your own decisions, you would need someone to make decisions for you.  It is important to discuss your preferences for medical treatment with this person while you are in good health.     Qualities of a surrogate decision maker:  • Willing to take on this role and responsibility  • Knows what you want for future medical care  • Willing to follow your wishes even if they don't agree with them  • Able to make difficult medical decisions under stressful circumstances    Advance Directives  These are legal documents you can create that will guide your healthcare team and decision maker(s) when needed. These documents can be stored in the electronic medical record.    · Living Will - a legal document to guide your care if you have a terminal condition or a serious illness and are unable to communicate. States vary by statute in document names/types, but most forms may include one or more of the following:        -  Directions regarding life-prolonging treatments        -  Directions regarding artificially provided nutrition/hydration        -  Choosing a healthcare decision maker        -  Direction  regarding organ/tissue donation    · Durable Power of  for Healthcare - this document names an -in-fact to make medical decisions for you, but it may also allow this person to make personal and financial decisions for you. Please seek the advice of an  if you need this type of document.    **Advance Directives are not required and no one may discriminate against you if you do not sign one.    Medical Orders  Many states allow specific forms/orders signed by your physician to record your wishes for medical treatment in your current state of health. This form, signed in personal communication with your physician, addresses resuscitation and other medical interventions that you may or may not want.      For more information or to schedule a time with a [facility] Advance Care Planning Facilitator contact: [facility]/ACP or call [phone] and someone will contact you directly.    IF YOU SMOKE OR USE TOBACCO PLEASE READ THE FOLLOWING:  Why is smoking bad for me?  Smoking increases the risk of heart disease, lung disease, vascular disease, stroke, and cancer. If you smoke, STOP!    Tobacco Use Disorder  Tobacco use disorder (TUD) occurs when a person craves, seeks, and uses tobacco, regardless of the consequences. This disorder can cause problems with mental and physical health. It can affect your ability to have healthy relationships, and it can keep you from meeting your responsibilities at work, home, or school.  Tobacco may be:  · Smoked as a cigarette or cigar.  · Inhaled using e-cigarettes.  · Smoked in a pipe or hookah.  · Chewed as smokeless tobacco.  · Inhaled into the nostrils as snuff.  Tobacco products contain a dangerous chemical called nicotine, which is very addictive. Nicotine triggers hormones that make the body feel stimulated and works on areas of the brain that make you feel good. These effects can make it hard for people to quit nicotine.  Tobacco contains many  other unsafe chemicals that can damage almost every organ in the body. Smoking tobacco also puts others in danger due to fire risk and possible health problems caused by breathing in secondhand smoke.  What are the signs or symptoms?  Symptoms of TUD may include:  · Being unable to slow down or stop your tobacco use.  · Spending an abnormal amount of time getting or using tobacco.  · Craving tobacco. Cravings may last for up to 6 months after quitting.  · Tobacco use that:  ? Interferes with your work, school, or home life.  ? Interferes with your personal and social relationships.  ? Makes you give up activities that you once enjoyed or found important.  · Using tobacco even though you know that it is:  ? Dangerous or bad for your health or someone else's health.  ? Causing problems in your life.  · Needing more and more of the substance to get the same effect (developing tolerance).  · Experiencing unpleasant symptoms if you do not use the substance (withdrawal). Withdrawal symptoms may include:  ? Depressed, anxious, or irritable mood.  ? Difficulty concentrating.  ? Increased appetite.  ? Restlessness or trouble sleeping.  · Using the substance to avoid withdrawal.  How is this diagnosed?  This condition may be diagnosed based on:  · Your current and past tobacco use. Your health care provider may ask questions about how your tobacco use affects your life.  · A physical exam.  You may be diagnosed with TUD if you have at least two symptoms within a 12-month period.  How is this treated?  This condition is treated by stopping tobacco use. Many people are unable to quit on their own and need help. Treatment may include:  · Nicotine replacement therapy (NRT). NRT provides nicotine without the other harmful chemicals in tobacco. NRT gradually lowers the dosage of nicotine in the body and reduces withdrawal symptoms. NRT is available as:  ? Over-the-counter gums, lozenges, and skin patches.  ? Prescription mouth  inhalers and nasal sprays.  · Medicine that acts on the brain to reduce cravings and withdrawal symptoms.  · A type of talk therapy that examines your triggers for tobacco use, how to avoid them, and how to cope with cravings (behavioral therapy).  · Hypnosis. This may help with withdrawal symptoms.  · Joining a support group for others coping with TUD.  The best treatment for TUD is usually a combination of medicine, talk therapy, and support groups. Recovery can be a long process. Many people start using tobacco again after stopping (relapse). If you relapse, it does not mean that treatment will not work.  Follow these instructions at home:    Lifestyle  · Do not use any products that contain nicotine or tobacco, such as cigarettes and e-cigarettes.  · Avoid things that trigger tobacco use as much as you can. Triggers include people and situations that usually cause you to use tobacco.  · Avoid drinks that contain caffeine, including coffee. These may worsen some withdrawal symptoms.  · Find ways to manage stress. Wanting to smoke may cause stress, and stress can make you want to smoke. Relaxation techniques such as deep breathing, meditation, and yoga may help.  · Attend support groups as needed. These groups are an important part of long-term recovery for many people.  General instructions  · Take over-the-counter and prescription medicines only as told by your health care provider.  · Check with your health care provider before taking any new prescription or over-the-counter medicines.  · Decide on a friend, family member, or smoking quit-line (such as 1-800-QUIT-NOW in the [place] that you can call or text when you feel the urge to smoke or when you need help coping with cravings.  · Keep all follow-up visits as told by your health care provider and therapist. This is important.  Contact a health care provider if:  · You are not able to take your medicines as prescribed.  · Your symptoms get worse, even with  treatment.  Summary  · Tobacco use disorder (TUD) occurs when a person craves, seeks, and uses tobacco regardless of the consequences.  · This condition may be diagnosed based on your current and past tobacco use and a physical exam.  · Many people are unable to quit on their own and need help. Recovery can be a long process.  · The most effective treatment for TUD is usually a combination of medicine, talk therapy, and support groups.  This information is not intended to replace advice given to you by your health care provider. Make sure you discuss any questions you have with your health care provider.  Document Revised: 12/05/2018 Document Reviewed: 12/05/2018  [employer] Patient Education © 2021 [employer]

## 2021-10-08 NOTE — PROGRESS NOTES
"    Subjective:     Encounter Date:10/08/2021      Patient ID: Carloz Aldridge is a 70 y.o. male with coronary artery disease with remote PCI , CHF AI, HTN, HLD and tobacco abuse.    Chief Complaint: \"shortness of breath with activity\"  Coronary Artery Disease  Presents for follow-up visit. Pertinent negatives include no chest pain, dizziness, leg swelling, palpitations, shortness of breath or weight gain. Risk factors include hyperlipidemia. The symptoms have been stable.   Hypertension  This is a chronic problem. The current episode started more than 1 year ago. The problem has been rapidly improving since onset. The problem is controlled. Pertinent negatives include no chest pain, malaise/fatigue, orthopnea, palpitations, PND or shortness of breath.   Hyperlipidemia  The current episode started more than 1 year ago. The problem is uncontrolled. Recent lipid tests were reviewed and are high. Pertinent negatives include no chest pain or shortness of breath.     Patient presents today for a routine follow up. Patient has known CHF with last EF noted to be 30% in 2015. He previously and continues to decline AICD implant. He also has coronary artery disease with remote PCI. He reports he has \"gone down hill\" since his last visit in regards to his breathing. He complains of worsening WELCH that he relates to his COPD. He follows with pulmonary in [place], and reports his lung function testing has also declined. He continues to smoke 1ppd. He denies chest pain, chest tightness and chest pressure. He notes intermittent swelling in his right ankle. He denies palpitations, weight gain, orthopnea and PND.     The following portions of the patient's history were reviewed and updated as appropriate: allergies, current medications, past family history, past medical history, past social history, past surgical history and problem list.    No Known Allergies    Current Outpatient Medications:   •  aspirin 81 MG tablet, Take  by " mouth daily., Disp: , Rfl:   •  Budeson-Glycopyrrol-Formoterol (Breztri Aerosphere) 160-9-4.8 MCG/ACT aerosol inhaler, Inhale 2 puffs 2 (Two) Times a Day., Disp: , Rfl:   •  carvedilol (COREG) 6.25 MG tablet, Take 1 tablet by mouth 2 (Two) Times a Day., Disp: 180 tablet, Rfl: 4  •  Diphenhydramine-APAP, sleep, (TYLENOL PM EXTRA STRENGTH PO), Take  by mouth as needed., Disp: , Rfl:   •  lisinopril (PRINIVIL,ZESTRIL) 5 MG tablet, TAKE 1 TABLET BY MOUTH EVERY DAY, Disp: 90 tablet, Rfl: 3  •  mometasone (NASONEX) 50 MCG/ACT nasal spray, INSTILL 2 SPRAYS INTO EACH NOSTRIL TWICE A DAY, Disp: , Rfl: 2  •  PROAIR RESPICLICK 108 (90 BASE) MCG/ACT inhaler, TAKE 2 PUFFS EVERY 4 HOURS, Disp: , Rfl: 5  •  spironolactone (ALDACTONE) 25 MG tablet, TAKE 1 TABLET BY MOUTH EVERY DAY, Disp: 90 tablet, Rfl: 3  •  atorvastatin (LIPITOR) 40 MG tablet, Take 1 tablet by mouth Daily., Disp: 90 tablet, Rfl: 3  •  nitroglycerin (NITROSTAT) 0.4 MG SL tablet, Place 1 tablet under the tongue Every 5 (Five) Minutes As Needed for Chest Pain. Take no more than 3 doses in 15 minutes., Disp: 25 tablet, Rfl: 2  Past Medical History:   Diagnosis Date   • CAD in native artery    • Cardiomyopathy (HCC)     Story: 9/14- EF 20-25% WORSENED LVEF AFTER COMING OFF MEDS (SELF-DIRECTED) 2/14-LVEF BELOW 35%; 4/2015 EF 30% Pt had an AICD in the past, which was removed due to infection.   • Chronic systolic heart failure (HCC)     Story: LVEF 30% AICD removed 2011 d/t infection. Per Dr. Canales risk of infection too high to reimplant AICD.   • COPD (chronic obstructive pulmonary disease) ([facility])    • Dyspnea, paroxysmal nocturnal    • Erectile dysfunction    • Hyperlipidemia, mild    • Hypertension    • Non compliance with medical treatment    • Tobacco use disorder    • V tach ([facility])        Social History     Socioeconomic History   • Marital status:      Spouse name: Not on file   • Number of children: Not on file   • Years of education: Not on file   •  Highest education level: Not on file   Tobacco Use   • Smoking status: Current Every Day Smoker     Packs/day: 1.00   • Smokeless tobacco: Never Used   • Tobacco comment: Has tried unsuccessfully in the past to quit   Substance and Sexual Activity   • Alcohol use: No   • Drug use: No   • Sexual activity: Defer       Review of Systems   Constitutional: Negative for malaise/fatigue, weight gain and weight loss.   Cardiovascular: Positive for dyspnea on exertion. Negative for chest pain, irregular heartbeat, leg swelling, near-syncope, orthopnea, palpitations, paroxysmal nocturnal dyspnea and syncope.   Respiratory: Negative for cough, shortness of breath, sleep disturbances due to breathing, sputum production and wheezing.    Skin: Negative for dry skin, flushing, itching and rash.   Gastrointestinal: Negative for hematemesis and hematochezia.   Neurological: Negative for dizziness, light-headedness, loss of balance and weakness.   All other systems reviewed and are negative.         Objective:     Vitals reviewed.   Constitutional:       General: Not in acute distress.     Appearance: Healthy appearance. Well-developed. Not diaphoretic.   Eyes:      General: No scleral icterus.     Conjunctiva/sclera: Conjunctivae normal.      Pupils: Pupils are equal, round, and reactive to light.   HENT:      Head: Normocephalic.    Mouth/Throat:      Pharynx: No oropharyngeal exudate.   Neck:      Vascular: No JVR.   Pulmonary:      Effort: Pulmonary effort is normal. No respiratory distress.      Breath sounds: Normal breath sounds. No wheezing. No rhonchi. No rales.   Chest:      Chest wall: Not tender to palpatation.   Cardiovascular:      Normal rate. Regular rhythm.   Pulses:     Intact distal pulses.   Edema:     Peripheral edema absent.   Abdominal:      General: Bowel sounds are normal. There is no distension.      Palpations: Abdomen is soft.      Tenderness: There is no abdominal tenderness.   Musculoskeletal: Normal  "range of motion.      Cervical back: Normal range of motion and neck supple. Skin:     General: Skin is warm and dry.      Coloration: Skin is not pale.      Findings: No erythema or rash.   Neurological:      Mental Status: Alert, oriented to person, place, and time and oriented to person, place and time.      Deep Tendon Reflexes: Reflexes are normal and symmetric.   Psychiatric:         Behavior: Behavior normal.             ECG 12 Lead    Date/Time: 10/8/2021 9:56 AM  Performed by: Yojana Corrales APRN  Authorized by: Yojana Corrales APRN   Comparison: compared with previous ECG from 1/15/2020  Comparison to previous ECst degree AV block now present  Rhythm: sinus rhythm  Rate: normal  BPM: 75  Conduction: 1st degree AV block and non-specific intraventricular conduction delay  Q waves: V4, V5, V6, II and III    T inversion: V5 and V6  QRS axis: normal  Other findings: T wave abnormality    Clinical impression: abnormal EKG          /60   Pulse 75   Ht 190.5 cm (75\")   Wt 81.9 kg (180 lb 9.6 oz)   SpO2 96%   BMI 22.57 kg/m²     Lab Review:   I have reviewed previous office notes, recent labs and recent cardiac testing.   Results for orders placed during the hospital encounter of 20    Adult Transthoracic Echo Complete W/ Cont if Necessary Per Protocol    Interpretation Summary  · The left ventricular cavity is severely dilated.  · Left atrial cavity size is mildly dilated.  · Left ventricular diastolic dysfunction.  · Left ventricular systolic function is severely decreased.  · Mild aortic valve regurgitation is present.  · The findings are consistent with dilated cardiomyopathy.    SEVERE DILATED (PROBABLY ISCHEMIC) CARDIOMYOPATHY  NORMAL RV SIZE AND FUNCTION  MILD AORTIC VALVE INSUFFICIENCY            Assessment:          Diagnosis Plan   1. CAD in native artery     2. Chronic systolic heart failure (HCC)     3. Nonrheumatic aortic valve insufficiency     4. Primary hypertension     5. " Other hyperlipidemia     6. Tobacco use disorder     7. Chronic obstructive pulmonary disease, unspecified COPD type (HCC)            Plan:       1. CAD- stable. C/o WELCH appear to be related to worsening COPD. Continue ASA, ACEI and BB. Will refill SL Nitro and statin.   2. CHF- compensated with class 3 symptoms. Last EF 31-35% in 1/2020. Previously declined and continues to decline AICD implant. Continue ACEI, BB and aldactone. Reviewed signs and symptoms of CHF and what to report with the patient. Patient instructed to restrict sodium and weigh daily. Report weight gain of greater than 2 lbs overnight or 5 lbs in 1 week. Pt verbalized understanding of instructions and plan of care.   3. AI- mild on echo in 1/2020. Will continue to monitor.    4. HTN- controlled. Followed by PCP.   5. HLD- likely uncontrolled as he has been out of his statin therapy due to not following up in our office. Will obtain recent Lipid panel. Refill for lipitor sent to pharmacy.  6. Tobacco use- Carloz Aldridge  reports that he has been smoking. He has been smoking about 1.00 pack per day. He has never used smokeless tobacco.. I have educated him on the risk of diseases from using tobacco products such as cancer, COPD and heart disease.  I advised him to quit and he is not willing to quit. I spent 3  minutes counseling the patient.  7. COPD- follows with SHILPA Duncan.           Follow up in 1 year with SHILPA William or sooner if symptoms worsen.     I spent 30 minutes caring for Carloz on this date of service. This time includes time spent by me in the following activities:preparing for the visit, reviewing tests, obtaining and/or reviewing a separately obtained history, performing a medically appropriate examination and/or evaluation , counseling and educating the patient/family/caregiver, ordering medications, tests, or procedures, documenting information in the medical record, independently interpreting results and  communicating that information with the patient/family/caregiver and care coordination     Advance Care Planning   ACP discussion was held with the patient during this visit. Patient does not have an advance directive, information provided.

## 2021-12-08 RX ORDER — SPIRONOLACTONE 25 MG/1
TABLET ORAL
Qty: 90 TABLET | Refills: 3 | Status: SHIPPED | OUTPATIENT
Start: 2021-12-08

## 2022-02-14 RX ORDER — LISINOPRIL 5 MG/1
TABLET ORAL
Qty: 90 TABLET | Refills: 3 | Status: SHIPPED | OUTPATIENT
Start: 2022-02-14

## 2022-07-06 RX ORDER — CARVEDILOL 6.25 MG/1
TABLET ORAL
Qty: 180 TABLET | Refills: 4 | Status: SHIPPED | OUTPATIENT
Start: 2022-07-06

## 2022-10-18 ENCOUNTER — TELEPHONE (OUTPATIENT)
Dept: CARDIOLOGY | Facility: CLINIC | Age: 72
End: 2022-10-18

## 2022-10-18 RX ORDER — ATORVASTATIN CALCIUM 40 MG/1
TABLET, FILM COATED ORAL
Qty: 90 TABLET | Refills: 3 | OUTPATIENT
Start: 2022-10-18

## 2022-10-18 NOTE — TELEPHONE ENCOUNTER
NO MORE REFILLS DUE TO PATIENT NO LONGER SEEING ONE OF OUR PROVIDERS.        I called to make a f/u appointment for his refills and Mr Oly said he is no longer going to see our providers

## 2025-06-16 ENCOUNTER — PATIENT ROUNDING (BHMG ONLY) (OUTPATIENT)
Dept: PULMONOLOGY | Facility: CLINIC | Age: 75
End: 2025-06-16
Payer: MEDICARE

## 2025-06-16 NOTE — PROGRESS NOTES
June 16, 2025    Hello, may I speak with Carloz Aldridge?    My name is ISRA      I am  with [facility]  [address]  [facility] [address] [phone].    Before we get started may I verify your date of birth? 1950    I am calling to officially welcome you to our practice and ask about your recent visit. Is this a good time to talk? yes    Tell me about your visit with us. What things went well?  GOOD VISIT       We're always looking for ways to make our patients' experiences even better. Do you have recommendations on ways we may improve?  no    Overall were you satisfied with your first visit to our practice? yes       I appreciate you taking the time to speak with me today. Is there anything else I can do for you? no      Thank you, and have a great day.

## 2025-06-20 RX ORDER — TAMSULOSIN HYDROCHLORIDE 0.4 MG/1
1 CAPSULE ORAL DAILY
COMMUNITY

## 2025-06-20 RX ORDER — DAPAGLIFLOZIN 10 MG/1
TABLET, FILM COATED ORAL
COMMUNITY

## 2025-06-20 RX ORDER — ALBUTEROL SULFATE 90 UG/1
2 INHALANT RESPIRATORY (INHALATION) EVERY 4 HOURS PRN
COMMUNITY

## 2025-06-20 RX ORDER — FUROSEMIDE 20 MG/1
20 TABLET ORAL 2 TIMES DAILY
COMMUNITY

## 2025-06-20 RX ORDER — SACUBITRIL AND VALSARTAN 24; 26 MG/1; MG/1
1 TABLET, FILM COATED ORAL 2 TIMES DAILY
COMMUNITY

## 2025-06-20 RX ORDER — DIPHENOXYLATE HYDROCHLORIDE AND ATROPINE SULFATE 2.5; .025 MG/1; MG/1
TABLET ORAL DAILY
COMMUNITY

## 2025-06-26 ENCOUNTER — OFFICE VISIT (OUTPATIENT)
Dept: PULMONOLOGY | Facility: CLINIC | Age: 75
End: 2025-06-26
Payer: MEDICARE

## 2025-06-26 ENCOUNTER — TELEPHONE (OUTPATIENT)
Dept: PULMONOLOGY | Facility: CLINIC | Age: 75
End: 2025-06-26

## 2025-06-26 VITALS
WEIGHT: 159 LBS | HEART RATE: 53 BPM | DIASTOLIC BLOOD PRESSURE: 62 MMHG | SYSTOLIC BLOOD PRESSURE: 115 MMHG | BODY MASS INDEX: 19.36 KG/M2 | OXYGEN SATURATION: 91 % | HEIGHT: 76 IN | RESPIRATION RATE: 18 BRPM

## 2025-06-26 DIAGNOSIS — J44.9 CHRONIC OBSTRUCTIVE PULMONARY DISEASE, UNSPECIFIED COPD TYPE: ICD-10-CM

## 2025-06-26 DIAGNOSIS — R91.1 PULMONARY NODULE: Primary | ICD-10-CM

## 2025-06-26 DIAGNOSIS — Z72.0 TOBACCO USE: ICD-10-CM

## 2025-06-26 DIAGNOSIS — J43.9 PULMONARY EMPHYSEMA, UNSPECIFIED EMPHYSEMA TYPE: ICD-10-CM

## 2025-06-26 NOTE — LETTER
[facility]  [facility]IC[facility]  [address] [facility] [place] [address]  Dept: [phone]  Dept Fax: [phone]  Loc: [phone]  Loc Fax: [phone]      2025    PATIENT NAME:  Carloz Aldridge  :  1950    PUSH IMAGING REQUEST    FACILITY:  [facility]  FAX: [phone]    DATE OF PROCEDURE:  25,10/2/24,21,19,16.      IMAGING REQUESTED:  CT OF CHEST AND X-RAY CXR 2V.       Ibrahima Rodriges DO is requesting the above imaging to be pushed to [facility] at your earliest convenience.    Thank You & Have a Blessed Day!  Vignesh HILL

## 2025-06-26 NOTE — LETTER
2025     Luis Webster MD  [address] [place] [address] [place] [address]    Patient: Carloz Aldridge   YOB: 1950   Date of Visit: 2025     Dear Dr. Darin MD:    Thank you for referring Carloz Aldridge to me for evaluation. Below are the relevant portions of my assessment and plan of care.    If you have questions, please do not hesitate to call me. I look forward to following Carloz along with you.         Sincerely,        Ailynntsamir Rodriges, DO        CC: SHILPA Calderon      Progress Notes:    Clinic Note - New Patient            NAME: Carloz Aldridge  MRN: [id number]  AGE: 74 y.o.            : 1950  PRIMARY CARE PROVIDER: Chintan Matos APRN               Reason for Visit:  Carloz Aldridge presents to clinic today as a new patient for the evaluation of pulmonary nodule.    History of Present Illness:  Mr. Alexandre is a 74-year-old male who presents to clinic today as a new patient for evaluation of right upper lobe pulmonary nodule.  Past medical history includes COPD, advanced emphysema, chronic hypoxic respiratory failure on 4 L, tobacco use, pulmonary nodule.    The patient normally follows with Dr. Rubio in [place].  He has been undergoing chest CTs due to his history of smoking.  Chest CT from October of last year did not show any pulmonary nodules.  The patient had a follow-up CT in April of this year which showed a new right upper lobe pulmonary nodule.  He has since undergone a PET/CT on May 19 which showed the nodule to be PET avid.  He has been referred to pulmonology here in [place] for evaluation of Ion bronchoscopy with EBUS.    Review of Systems:  A full 12-point review of systems was performed and was negative except as documented in the HPI.               Social History:  Social History     Socioeconomic History   • Marital status:    Tobacco Use   • Smoking status: Every Day     Current packs/day: 1.00     Types: Cigarettes      Passive exposure: Current   • Smokeless tobacco: Never   • Tobacco comments:     Has tried unsuccessfully in the past to quit- 6/26/25   Vaping Use   • Vaping status: Never Used   Substance and Sexual Activity   • Alcohol use: No   • Drug use: No   • Sexual activity: Defer                 Physical Exam:  General: No acute distress, cooperative.  Head: Atraumatic, normocephalic, normal inspection.  Eyes: EOMI, normal inspection.  ENT: Mucous membranes moist, normal inspection. No thrush.  Heart: RRR, no murmur  Lungs: Severely diminished, no wheezing  MSK: No edema or clubbing  GI/abdominal: Soft, nontender.  Neurologic: Alert, oriented.  Cranial nerves II through XII grossly intact.      Imaging Review:  I personally reviewed the chest imaging done 2024 and 2025:  Chest CT from 2024 showed advanced emphysematous changes, no concerning pulmonary nodules.  Chest CT from April of this year with a new right upper lobe pulmonary nodule.  Follow-up PET/CT showed that the nodule to be PET avid.  No PET avid lymph nodes.      Pulmonary Functions Testing Results:  None available for review.            Problem List:  Problem List Items Addressed This Visit       Tobacco use    COPD (chronic obstructive pulmonary disease)    Relevant Medications    albuterol sulfate HFA (Ventolin HFA) 108 (90 Base) MCG/ACT inhaler    Pulmonary nodule - Primary    Relevant Medications    albuterol sulfate HFA (Ventolin HFA) 108 (90 Base) MCG/ACT inhaler         Pulmonary Assessment:  Patient is a 74-year-old male who presents to clinic today as a new patient for the above problems.  He is followed with Dr. Rubio in [place].  Referred to [place] for evaluation of biopsy of the PET avid right upper lobe pulmonary nodule.  Reviewed his chest imaging in clinic today.  After discussion the patient wished to pursue biopsy of the pulmonary nodule.  Risk and benefits were discussed.      Plan:   - Case request for Ion bronchoscopy with EBUS  - Chest CT  without contrast for Ion mapping  - We will see him back in clinic for a postprocedure follow-up, otherwise we will follow-up with Dr. Rubio           Follow Up:  Post procedurally         Thank you Chintan Matos APRN for this referral and allowing me to participate in this patient's care.           Past Medical History:   Past Medical History:   Diagnosis Date   • CAD in native artery    • Cardiomyopathy     Story: 9/14- EF 20-25% WORSENED LVEF AFTER COMING OFF MEDS (SELF-DIRECTED) 2/14-LVEF BELOW 35%; 4/2015 EF 30% Pt had an AICD in the past, which was removed due to infection.   • Chronic systolic heart failure     Story: LVEF 30% AICD removed 2011 d/t infection. Per Dr. Canales risk of infection too high to reimplant AICD.   • COPD (chronic obstructive pulmonary disease)    • Dyspnea, paroxysmal nocturnal    • Erectile dysfunction    • Hyperlipidemia, mild    • Hypertension    • Non compliance with medical treatment    • Tobacco use disorder    • V tach          Past Surgical History:   Past Surgical History:   Procedure Laterality Date   • CARDIAC CATHETERIZATION Right    • CATARACT EXTRACTION Bilateral    • CERVICAL DISCECTOMY ANTERIOR      C6 and 7   • CORONARY STENT PLACEMENT     • EYE SURGERY     • HERNIA REPAIR      x2   • MOUTH SURGERY     • OTHER SURGICAL HISTORY      laser surgery   • TOE SURGERY      great toe   • TONSILLECTOMY      as chile   • VASECTOMY           Family History:   Family History   Problem Relation Age of Onset   • Heart disease Mother    • Hypertension Mother    • Cancer Father    • Heart disease Father    • Hypertension Father          Medications:   Prior to Admission medications    Medication Sig Start Date End Date Taking? Authorizing Provider   albuterol sulfate HFA (Ventolin HFA) 108 (90 Base) MCG/ACT inhaler Inhale 2 puffs Every 4 (Four) Hours As Needed for Wheezing.   Yes Provider, MD Alvaro   Cholecalciferol (Vitamin D-3) 125 MCG (5000 UT) tablet Take  by mouth.   Yes  Alvaro Alvarado MD   dapagliflozin Propanediol (Farxiga) 10 MG tablet Take  by mouth.   Yes Alvaro Alvarado MD   furosemide (LASIX) 20 MG tablet Take 1 tablet by mouth 2 (Two) Times a Day.   Yes Alvaro Alvarado MD   multivitamin (MULTI-DAY PO) Take  by mouth Daily.   Yes Alvaro Alvarado MD   Naproxen Sodium (ALEVE PO) Take 200 mg by mouth As Needed.   Yes Alvaro Alvarado MD   sacubitril-valsartan (Entresto) 24-26 MG tablet Take 1 tablet by mouth 2 (Two) Times a Day.   Yes Alvaro Alvarado MD   tamsulosin (FLOMAX) 0.4 MG capsule 24 hr capsule Take 1 capsule by mouth Daily.   Yes Alvaro Alvarado MD   aspirin 81 MG tablet Take  by mouth daily.    Alvaro Alvarado MD   atorvastatin (LIPITOR) 40 MG tablet Take 1 tablet by mouth Daily. 10/8/21   Yojana Corrales APRN   Budeson-Glycopyrrol-Formoterol (Breztri Aerosphere) 160-9-4.8 MCG/ACT aerosol inhaler Inhale 2 puffs 2 (Two) Times a Day.    Alvaro Alvarado MD   carvedilol (COREG) 6.25 MG tablet TAKE 1 TABLET BY MOUTH TWICE A DAY 7/6/22   Bradford Camargo APRN   Diphenhydramine-APAP, sleep, (TYLENOL PM EXTRA STRENGTH PO) Take  by mouth as needed.    Alvaro Alvarado MD   lisinopril (PRINIVIL,ZESTRIL) 5 MG tablet TAKE 1 TABLET BY MOUTH EVERY DAY 2/14/22   Yojana Corrales APRN   mometasone (NASONEX) 50 MCG/ACT nasal spray INSTILL 2 SPRAYS INTO EACH NOSTRIL TWICE A DAY 8/13/16   Alvaro Alvarado MD   nitroglycerin (NITROSTAT) 0.4 MG SL tablet Place 1 tablet under the tongue Every 5 (Five) Minutes As Needed for Chest Pain. Take no more than 3 doses in 15 minutes. 10/8/21   Yojana Corrales APRN   PROAIR RESPICLICK 108 (90 BASE) MCG/ACT inhaler TAKE 2 PUFFS EVERY 4 HOURS 8/13/16   Alvaro Alvarado MD   spironolactone (ALDACTONE) 25 MG tablet TAKE 1 TABLET BY MOUTH EVERY DAY 12/8/21   Yojana Corrales APRN         Allergies:   Patient has no known allergies.      Results Review:             There were no vitals  taken for this visit.               Ibrahima Rodriges DO  Pulmonary/Critical Care  6/26/2025  11:42 CDT

## 2025-06-26 NOTE — LETTER
[facility]  [facility] CRITICAL [facility]ARE MEDICINE  [address] [address]  Dept: [phone]  Dept Fax: [phone]  Loc: [phone]  Loc Fax: [phone]      2025    PATIENT NAME:  Carloz Aldridge  :  1950    PUSH IMAGING REQUEST    FACILITY:  [facility]  FAX: [phone]    DATE OF PROCEDURE:  25    IMAGING REQUESTED:  PET SCAN -IMAGING       Ibrhaima Rodriges DO is requesting the above imaging to be pushed to [facility] at your earliest convenience.    Thank You & Have a Blessed Day!  Vignesh HILL

## 2025-06-26 NOTE — PROGRESS NOTES
Clinic Note - New Patient            NAME: Carloz Aldridge  MRN: [id number]  AGE: 74 y.o.            : 1950  PRIMARY CARE PROVIDER: Chintan Matos APRN               Reason for Visit:  Carloz Aldridge presents to clinic today as a new patient for the evaluation of pulmonary nodule.    History of Present Illness:  Mr. Alexandre is a 74-year-old male who presents to clinic today as a new patient for evaluation of right upper lobe pulmonary nodule.  Past medical history includes COPD, advanced emphysema, chronic hypoxic respiratory failure on 4 L, tobacco use, pulmonary nodule.    The patient normally follows with Dr. Rubio in [place].  He has been undergoing chest CTs due to his history of smoking.  Chest CT from October of last year did not show any pulmonary nodules.  The patient had a follow-up CT in April of this year which showed a new right upper lobe pulmonary nodule.  He has since undergone a PET/CT on May 19 which showed the nodule to be PET avid.  He has been referred to pulmonology here in [place] for evaluation of Ion bronchoscopy with EBUS.    Review of Systems:  A full 12-point review of systems was performed and was negative except as documented in the HPI.               Social History:  Social History     Socioeconomic History    Marital status:    Tobacco Use    Smoking status: Every Day     Current packs/day: 1.00     Types: Cigarettes     Passive exposure: Current    Smokeless tobacco: Never    Tobacco comments:     Has tried unsuccessfully in the past to quit- 27   Vaping Use    Vaping status: Never Used   Substance and Sexual Activity    Alcohol use: No    Drug use: No    Sexual activity: Defer                 Physical Exam:  General: No acute distress, cooperative.  Head: Atraumatic, normocephalic, normal inspection.  Eyes: EOMI, normal inspection.  ENT: Mucous membranes moist, normal inspection. No thrush.  Heart: RRR, no murmur  Lungs: Severely diminished, no  wheezing  MSK: No edema or clubbing  GI/abdominal: Soft, nontender.  Neurologic: Alert, oriented.  Cranial nerves II through XII grossly intact.      Imaging Review:  I personally reviewed the chest imaging done 2024 and 2025:  Chest CT from 2024 showed advanced emphysematous changes, no concerning pulmonary nodules.  Chest CT from April of this year with a new right upper lobe pulmonary nodule.  Follow-up PET/CT showed that the nodule to be PET avid.  No PET avid lymph nodes.      Pulmonary Functions Testing Results:  None available for review.            Problem List:  Problem List Items Addressed This Visit       Tobacco use    COPD (chronic obstructive pulmonary disease)    Relevant Medications    albuterol sulfate HFA (Ventolin HFA) 108 (90 Base) MCG/ACT inhaler    Pulmonary nodule - Primary    Relevant Medications    albuterol sulfate HFA (Ventolin HFA) 108 (90 Base) MCG/ACT inhaler         Pulmonary Assessment:  Patient is a 74-year-old male who presents to clinic today as a new patient for the above problems.  He is followed with Dr. Rubio in [place].  Referred to [place] for evaluation of biopsy of the PET avid right upper lobe pulmonary nodule.  Reviewed his chest imaging in clinic today.  After discussion the patient wished to pursue biopsy of the pulmonary nodule.  Risk and benefits were discussed.      Plan:   - Case request for Ion bronchoscopy with EBUS  - Chest CT without contrast for Ion mapping  - We will see him back in clinic for a postprocedure follow-up, otherwise we will follow-up with Dr. Rubio           Follow Up:  Post procedurally         Thank you Chintan Matos APRN for this referral and allowing me to participate in this patient's care.           Past Medical History:   Past Medical History:   Diagnosis Date    CAD in native artery     Cardiomyopathy     Story: 9/14- EF 20-25% WORSENED LVEF AFTER COMING OFF MEDS (SELF-DIRECTED) 2/14-LVEF BELOW 35%; 4/2015 EF 30% Pt had an AICD in the  tracey, which was removed due to infection.    Chronic systolic heart failure     Story: LVEF 30% AICD removed 2011 d/t infection. Per Dr. Canales risk of infection too high to reimplant AICD.    COPD (chronic obstructive pulmonary disease)     Dyspnea, paroxysmal nocturnal     Erectile dysfunction     Hyperlipidemia, mild     Hypertension     Non compliance with medical treatment     Tobacco use disorder     V tach          Past Surgical History:   Past Surgical History:   Procedure Laterality Date    CARDIAC CATHETERIZATION Right     CATARACT EXTRACTION Bilateral     CERVICAL DISCECTOMY ANTERIOR      C6 and 7    CORONARY STENT PLACEMENT      EYE SURGERY      HERNIA REPAIR      x2    MOUTH SURGERY      OTHER SURGICAL HISTORY      laser surgery    TOE SURGERY      great toe    TONSILLECTOMY      as chile    VASECTOMY           Family History:   Family History   Problem Relation Age of Onset    Heart disease Mother     Hypertension Mother     Cancer Father     Heart disease Father     Hypertension Father          Medications:   Prior to Admission medications    Medication Sig Start Date End Date Taking? Authorizing Provider   albuterol sulfate HFA (Ventolin HFA) 108 (90 Base) MCG/ACT inhaler Inhale 2 puffs Every 4 (Four) Hours As Needed for Wheezing.   Yes Alvaro Alvarado MD   Cholecalciferol (Vitamin D-3) 125 MCG (5000 UT) tablet Take  by mouth.   Yes Alvaro Alvarado MD   dapagliflozin Propanediol (Farxiga) 10 MG tablet Take  by mouth.   Yes Alvaro Alvarado MD   furosemide (LASIX) 20 MG tablet Take 1 tablet by mouth 2 (Two) Times a Day.   Yes Alvaro Alvarado MD   multivitamin (MULTI-DAY PO) Take  by mouth Daily.   Yes Alvaro Alvarado MD   Naproxen Sodium (ALEVE PO) Take 200 mg by mouth As Needed.   Yes Alvaro Alvarado MD   sacubitril-valsartan (Entresto) 24-26 MG tablet Take 1 tablet by mouth 2 (Two) Times a Day.   Yes Alvaro Alvarado MD   tamsulosin (FLOMAX) 0.4 MG capsule 24  hr capsule Take 1 capsule by mouth Daily.   Yes Alvaro Alvarado MD   aspirin 81 MG tablet Take  by mouth daily.    Alvaro Alvarado MD   atorvastatin (LIPITOR) 40 MG tablet Take 1 tablet by mouth Daily. 10/8/21   Yojana Corrales APRN   Budeson-Glycopyrrol-Formoterol (Breztri Aerosphere) 160-9-4.8 MCG/ACT aerosol inhaler Inhale 2 puffs 2 (Two) Times a Day.    Alvaro Alvarado MD   carvedilol (COREG) 6.25 MG tablet TAKE 1 TABLET BY MOUTH TWICE A DAY 7/6/22   Bradford Camargo APRN   Diphenhydramine-APAP, sleep, (TYLENOL PM EXTRA STRENGTH PO) Take  by mouth as needed.    Alvaro Alvarado MD   lisinopril (PRINIVIL,ZESTRIL) 5 MG tablet TAKE 1 TABLET BY MOUTH EVERY DAY 2/14/22   Yojana Corrales APRN   mometasone (NASONEX) 50 MCG/ACT nasal spray INSTILL 2 SPRAYS INTO EACH NOSTRIL TWICE A DAY 8/13/16   Alvaro Alvarado MD   nitroglycerin (NITROSTAT) 0.4 MG SL tablet Place 1 tablet under the tongue Every 5 (Five) Minutes As Needed for Chest Pain. Take no more than 3 doses in 15 minutes. 10/8/21   Yojana Corrales APRN   PROAIR RESPICLICK 108 (90 BASE) MCG/ACT inhaler TAKE 2 PUFFS EVERY 4 HOURS 8/13/16   Alvaro Alvarado MD   spironolactone (ALDACTONE) 25 MG tablet TAKE 1 TABLET BY MOUTH EVERY DAY 12/8/21   Yojana Corrales APRN         Allergies:   Patient has no known allergies.      Results Review:             There were no vitals taken for this visit.               Ibrahima Rodriges DO  Pulmonary/Critical Care  6/26/2025  11:42 CDT

## 2025-06-26 NOTE — H&P (VIEW-ONLY)
Clinic Note - New Patient            NAME: Carloz Aldridge  MRN: [id number]  AGE: 74 y.o.            : 1950  PRIMARY CARE PROVIDER: Chintan aMtos APRN               Reason for Visit:  Carloz Aldridge presents to clinic today as a new patient for the evaluation of pulmonary nodule.    History of Present Illness:  Mr. Alexandre is a 74-year-old male who presents to clinic today as a new patient for evaluation of right upper lobe pulmonary nodule.  Past medical history includes COPD, advanced emphysema, chronic hypoxic respiratory failure on 4 L, tobacco use, pulmonary nodule.    The patient normally follows with Dr. Rubio in [place].  He has been undergoing chest CTs due to his history of smoking.  Chest CT from October of last year did not show any pulmonary nodules.  The patient had a follow-up CT in April of this year which showed a new right upper lobe pulmonary nodule.  He has since undergone a PET/CT on May 19 which showed the nodule to be PET avid.  He has been referred to pulmonology here in [place] for evaluation of Ion bronchoscopy with EBUS.    Review of Systems:  A full 12-point review of systems was performed and was negative except as documented in the HPI.               Social History:  Social History     Socioeconomic History    Marital status:    Tobacco Use    Smoking status: Every Day     Current packs/day: 1.00     Types: Cigarettes     Passive exposure: Current    Smokeless tobacco: Never    Tobacco comments:     Has tried unsuccessfully in the past to quit- 27   Vaping Use    Vaping status: Never Used   Substance and Sexual Activity    Alcohol use: No    Drug use: No    Sexual activity: Defer                 Physical Exam:  General: No acute distress, cooperative.  Head: Atraumatic, normocephalic, normal inspection.  Eyes: EOMI, normal inspection.  ENT: Mucous membranes moist, normal inspection. No thrush.  Heart: RRR, no murmur  Lungs: Severely diminished, no  wheezing  MSK: No edema or clubbing  GI/abdominal: Soft, nontender.  Neurologic: Alert, oriented.  Cranial nerves II through XII grossly intact.      Imaging Review:  I personally reviewed the chest imaging done 2024 and 2025:  Chest CT from 2024 showed advanced emphysematous changes, no concerning pulmonary nodules.  Chest CT from April of this year with a new right upper lobe pulmonary nodule.  Follow-up PET/CT showed that the nodule to be PET avid.  No PET avid lymph nodes.      Pulmonary Functions Testing Results:  None available for review.            Problem List:  Problem List Items Addressed This Visit       Tobacco use    COPD (chronic obstructive pulmonary disease)    Relevant Medications    albuterol sulfate HFA (Ventolin HFA) 108 (90 Base) MCG/ACT inhaler    Pulmonary nodule - Primary    Relevant Medications    albuterol sulfate HFA (Ventolin HFA) 108 (90 Base) MCG/ACT inhaler         Pulmonary Assessment:  Patient is a 74-year-old male who presents to clinic today as a new patient for the above problems.  He is followed with Dr. Rubio in [place].  Referred to [place] for evaluation of biopsy of the PET avid right upper lobe pulmonary nodule.  Reviewed his chest imaging in clinic today.  After discussion the patient wished to pursue biopsy of the pulmonary nodule.  Risk and benefits were discussed.      Plan:   - Case request for Ion bronchoscopy with EBUS  - Chest CT without contrast for Ion mapping  - We will see him back in clinic for a postprocedure follow-up, otherwise we will follow-up with Dr. Rubio           Follow Up:  Post procedurally         Thank you Chintan Matos APRN for this referral and allowing me to participate in this patient's care.           Past Medical History:   Past Medical History:   Diagnosis Date    CAD in native artery     Cardiomyopathy     Story: 9/14- EF 20-25% WORSENED LVEF AFTER COMING OFF MEDS (SELF-DIRECTED) 2/14-LVEF BELOW 35%; 4/2015 EF 30% Pt had an AICD in the  tracey, which was removed due to infection.    Chronic systolic heart failure     Story: LVEF 30% AICD removed 2011 d/t infection. Per Dr. Canales risk of infection too high to reimplant AICD.    COPD (chronic obstructive pulmonary disease)     Dyspnea, paroxysmal nocturnal     Erectile dysfunction     Hyperlipidemia, mild     Hypertension     Non compliance with medical treatment     Tobacco use disorder     V tach          Past Surgical History:   Past Surgical History:   Procedure Laterality Date    CARDIAC CATHETERIZATION Right     CATARACT EXTRACTION Bilateral     CERVICAL DISCECTOMY ANTERIOR      C6 and 7    CORONARY STENT PLACEMENT      EYE SURGERY      HERNIA REPAIR      x2    MOUTH SURGERY      OTHER SURGICAL HISTORY      laser surgery    TOE SURGERY      great toe    TONSILLECTOMY      as chile    VASECTOMY           Family History:   Family History   Problem Relation Age of Onset    Heart disease Mother     Hypertension Mother     Cancer Father     Heart disease Father     Hypertension Father          Medications:   Prior to Admission medications    Medication Sig Start Date End Date Taking? Authorizing Provider   albuterol sulfate HFA (Ventolin HFA) 108 (90 Base) MCG/ACT inhaler Inhale 2 puffs Every 4 (Four) Hours As Needed for Wheezing.   Yes Alvaro Alvarado MD   Cholecalciferol (Vitamin D-3) 125 MCG (5000 UT) tablet Take  by mouth.   Yes Alvaro Alvarado MD   dapagliflozin Propanediol (Farxiga) 10 MG tablet Take  by mouth.   Yes Alvaro Alvarado MD   furosemide (LASIX) 20 MG tablet Take 1 tablet by mouth 2 (Two) Times a Day.   Yes Alvaro Alvarado MD   multivitamin (MULTI-DAY PO) Take  by mouth Daily.   Yes Alvaro Alvarado MD   Naproxen Sodium (ALEVE PO) Take 200 mg by mouth As Needed.   Yes Alvaro Alvarado MD   sacubitril-valsartan (Entresto) 24-26 MG tablet Take 1 tablet by mouth 2 (Two) Times a Day.   Yes Alvaro Alvarado MD   tamsulosin (FLOMAX) 0.4 MG capsule 24  hr capsule Take 1 capsule by mouth Daily.   Yes Alvaro Alvarado MD   aspirin 81 MG tablet Take  by mouth daily.    Alvaro Alvarado MD   atorvastatin (LIPITOR) 40 MG tablet Take 1 tablet by mouth Daily. 10/8/21   Yojana Corrales APRN   Budeson-Glycopyrrol-Formoterol (Breztri Aerosphere) 160-9-4.8 MCG/ACT aerosol inhaler Inhale 2 puffs 2 (Two) Times a Day.    Alvaro Alvarado MD   carvedilol (COREG) 6.25 MG tablet TAKE 1 TABLET BY MOUTH TWICE A DAY 7/6/22   Bradford Camargo APRN   Diphenhydramine-APAP, sleep, (TYLENOL PM EXTRA STRENGTH PO) Take  by mouth as needed.    Alvaro Alvarado MD   lisinopril (PRINIVIL,ZESTRIL) 5 MG tablet TAKE 1 TABLET BY MOUTH EVERY DAY 2/14/22   Yojana Corrales APRN   mometasone (NASONEX) 50 MCG/ACT nasal spray INSTILL 2 SPRAYS INTO EACH NOSTRIL TWICE A DAY 8/13/16   Alvaro Alvarado MD   nitroglycerin (NITROSTAT) 0.4 MG SL tablet Place 1 tablet under the tongue Every 5 (Five) Minutes As Needed for Chest Pain. Take no more than 3 doses in 15 minutes. 10/8/21   Yojana Corrales APRN   PROAIR RESPICLICK 108 (90 BASE) MCG/ACT inhaler TAKE 2 PUFFS EVERY 4 HOURS 8/13/16   Alvaro Alvarado MD   spironolactone (ALDACTONE) 25 MG tablet TAKE 1 TABLET BY MOUTH EVERY DAY 12/8/21   Yojana Corrales APRN         Allergies:   Patient has no known allergies.      Results Review:             There were no vitals taken for this visit.               Ibrahima Rodriges DO  Pulmonary/Critical Care  6/26/2025  11:42 CDT

## 2025-06-30 ENCOUNTER — PRE-ADMISSION TESTING (OUTPATIENT)
Dept: PREADMISSION TESTING | Facility: HOSPITAL | Age: 75
End: 2025-06-30
Payer: MEDICARE

## 2025-06-30 ENCOUNTER — HOSPITAL ENCOUNTER (OUTPATIENT)
Dept: CT IMAGING | Facility: HOSPITAL | Age: 75
Discharge: HOME | End: 2025-06-30
Payer: MEDICARE

## 2025-06-30 VITALS
RESPIRATION RATE: 18 BRPM | OXYGEN SATURATION: 95 % | HEART RATE: 75 BPM | BODY MASS INDEX: 19.57 KG/M2 | HEIGHT: 75 IN | DIASTOLIC BLOOD PRESSURE: 47 MMHG | WEIGHT: 157.41 LBS | SYSTOLIC BLOOD PRESSURE: 102 MMHG

## 2025-06-30 DIAGNOSIS — Z72.0 TOBACCO USE: ICD-10-CM

## 2025-06-30 DIAGNOSIS — R91.1 PULMONARY NODULE: ICD-10-CM

## 2025-06-30 LAB
ANION GAP SERPL CALCULATED.3IONS-SCNC: 9 MMOL/L (ref 5–15)
BUN SERPL-MCNC: 28.7 MG/DL (ref 8–23)
BUN/CREAT SERPL: 17 (ref 7–25)
CALCIUM SPEC-SCNC: 9.2 MG/DL (ref 8.6–10.5)
CHLORIDE SERPL-SCNC: 105 MMOL/L (ref 98–107)
CO2 SERPL-SCNC: 32 MMOL/L (ref 22–29)
CREAT SERPL-MCNC: 1.69 MG/DL (ref 0.76–1.27)
DEPRECATED RDW RBC AUTO: 50.7 FL (ref 37–54)
EGFRCR SERPLBLD CKD-EPI 2021: 42.6 ML/MIN/1.73
ERYTHROCYTE [DISTWIDTH] IN BLOOD BY AUTOMATED COUNT: 13.3 % (ref 12.3–15.4)
GLUCOSE SERPL-MCNC: 101 MG/DL (ref 65–99)
HCT VFR BLD AUTO: 38.4 % (ref 37.5–51)
HGB BLD-MCNC: 12 G/DL (ref 13–17.7)
INR PPP: 1.06 (ref 0.91–1.09)
MCH RBC QN AUTO: 32.1 PG (ref 26.6–33)
MCHC RBC AUTO-ENTMCNC: 31.3 G/DL (ref 31.5–35.7)
MCV RBC AUTO: 102.7 FL (ref 79–97)
PLATELET # BLD AUTO: 186 10*3/MM3 (ref 140–450)
PMV BLD AUTO: 9.6 FL (ref 6–12)
POTASSIUM SERPL-SCNC: 4.1 MMOL/L (ref 3.5–5.2)
PROTHROMBIN TIME: 14.4 SECONDS (ref 11.8–14.8)
RBC # BLD AUTO: 3.74 10*6/MM3 (ref 4.14–5.8)
SODIUM SERPL-SCNC: 146 MMOL/L (ref 136–145)
WBC NRBC COR # BLD AUTO: 9.21 10*3/MM3 (ref 3.4–10.8)

## 2025-06-30 PROCEDURE — 85027 COMPLETE CBC AUTOMATED: CPT | Performed by: INTERNAL MEDICINE

## 2025-06-30 PROCEDURE — 85610 PROTHROMBIN TIME: CPT | Performed by: INTERNAL MEDICINE

## 2025-06-30 PROCEDURE — 80048 BASIC METABOLIC PNL TOTAL CA: CPT | Performed by: INTERNAL MEDICINE

## 2025-06-30 PROCEDURE — 71250 CT THORAX DX C-: CPT

## 2025-06-30 PROCEDURE — 93005 ELECTROCARDIOGRAM TRACING: CPT | Performed by: INTERNAL MEDICINE

## 2025-06-30 NOTE — DISCHARGE INSTRUCTIONS

## 2025-07-01 ENCOUNTER — TELEPHONE (OUTPATIENT)
Dept: PULMONOLOGY | Facility: CLINIC | Age: 75
End: 2025-07-01
Payer: MEDICARE

## 2025-07-01 NOTE — TELEPHONE ENCOUNTER
FYI--Patient informed that cardiac risk assessment was received by SHILPA Rogel. (See Media) Aspirin to be held 5-7 days.  Patient's last dose of ASA was Sunday morning and he wants to proceed with the surgery on 07/03/24.

## 2025-07-03 ENCOUNTER — ANESTHESIA (OUTPATIENT)
Dept: PERIOP | Facility: HOSPITAL | Age: 75
End: 2025-07-03
Payer: MEDICARE

## 2025-07-03 ENCOUNTER — APPOINTMENT (OUTPATIENT)
Dept: GENERAL RADIOLOGY | Facility: HOSPITAL | Age: 75
End: 2025-07-03
Payer: MEDICARE

## 2025-07-03 ENCOUNTER — ANESTHESIA EVENT (OUTPATIENT)
Dept: PERIOP | Facility: HOSPITAL | Age: 75
End: 2025-07-03
Payer: MEDICARE

## 2025-07-03 ENCOUNTER — HOSPITAL ENCOUNTER (OUTPATIENT)
Facility: HOSPITAL | Age: 75
Setting detail: HOSPITAL OUTPATIENT SURGERY
Discharge: HOME | End: 2025-07-03
Attending: INTERNAL MEDICINE | Admitting: INTERNAL MEDICINE
Payer: MEDICARE

## 2025-07-03 VITALS
RESPIRATION RATE: 16 BRPM | DIASTOLIC BLOOD PRESSURE: 58 MMHG | OXYGEN SATURATION: 95 % | TEMPERATURE: 97.8 F | SYSTOLIC BLOOD PRESSURE: 104 MMHG | HEART RATE: 77 BPM

## 2025-07-03 DIAGNOSIS — R91.1 PULMONARY NODULE: ICD-10-CM

## 2025-07-03 DIAGNOSIS — Z72.0 TOBACCO USE: ICD-10-CM

## 2025-07-03 LAB
KOH PREP NAIL: NORMAL
KOH PREP NAIL: NORMAL

## 2025-07-03 PROCEDURE — 88312 SPECIAL STAINS GROUP 1: CPT | Performed by: INTERNAL MEDICINE

## 2025-07-03 PROCEDURE — 25010000002 FENTANYL CITRATE (PF) 50 MCG/ML SOLUTION: Performed by: NURSE ANESTHETIST, CERTIFIED REGISTERED

## 2025-07-03 PROCEDURE — 88305 TISSUE EXAM BY PATHOLOGIST: CPT | Performed by: INTERNAL MEDICINE

## 2025-07-03 PROCEDURE — 31654 BRONCH EBUS IVNTJ PERPH LES: CPT | Performed by: INTERNAL MEDICINE

## 2025-07-03 PROCEDURE — 25010000002 SUGAMMADEX 200 MG/2ML SOLUTION: Performed by: NURSE ANESTHETIST, CERTIFIED REGISTERED

## 2025-07-03 PROCEDURE — 25010000002 DEXAMETHASONE PER 1 MG: Performed by: NURSE ANESTHETIST, CERTIFIED REGISTERED

## 2025-07-03 PROCEDURE — 31629 BRONCHOSCOPY/NEEDLE BX EACH: CPT | Performed by: INTERNAL MEDICINE

## 2025-07-03 PROCEDURE — 76380 CAT SCAN FOLLOW-UP STUDY: CPT

## 2025-07-03 PROCEDURE — 76000 FLUOROSCOPY <1 HR PHYS/QHP: CPT

## 2025-07-03 PROCEDURE — 31653 BRONCH EBUS SAMPLNG 3/> NODE: CPT | Performed by: INTERNAL MEDICINE

## 2025-07-03 PROCEDURE — 88112 CYTOPATH CELL ENHANCE TECH: CPT | Performed by: INTERNAL MEDICINE

## 2025-07-03 PROCEDURE — 25010000002 PROPOFOL 10 MG/ML EMULSION: Performed by: NURSE ANESTHETIST, CERTIFIED REGISTERED

## 2025-07-03 PROCEDURE — 88172 CYTP DX EVAL FNA 1ST EA SITE: CPT | Performed by: INTERNAL MEDICINE

## 2025-07-03 PROCEDURE — 31628 BRONCHOSCOPY/LUNG BX EACH: CPT | Performed by: INTERNAL MEDICINE

## 2025-07-03 PROCEDURE — 71045 X-RAY EXAM CHEST 1 VIEW: CPT

## 2025-07-03 PROCEDURE — C1726 CATH, BAL DIL, NON-VASCULAR: HCPCS | Performed by: INTERNAL MEDICINE

## 2025-07-03 PROCEDURE — 25010000002 LIDOCAINE PF 2% 2 % SOLUTION: Performed by: NURSE ANESTHETIST, CERTIFIED REGISTERED

## 2025-07-03 PROCEDURE — 88177 CYTP FNA EVAL EA ADDL: CPT | Performed by: INTERNAL MEDICINE

## 2025-07-03 PROCEDURE — 25010000002 ONDANSETRON PER 1 MG: Performed by: NURSE ANESTHETIST, CERTIFIED REGISTERED

## 2025-07-03 PROCEDURE — 31627 NAVIGATIONAL BRONCHOSCOPY: CPT | Performed by: INTERNAL MEDICINE

## 2025-07-03 PROCEDURE — 87102 FUNGUS ISOLATION CULTURE: CPT | Performed by: INTERNAL MEDICINE

## 2025-07-03 PROCEDURE — 87206 SMEAR FLUORESCENT/ACID STAI: CPT | Performed by: INTERNAL MEDICINE

## 2025-07-03 PROCEDURE — 25010000002 GLYCOPYRROLATE 0.4 MG/2ML SOLUTION: Performed by: NURSE ANESTHETIST, CERTIFIED REGISTERED

## 2025-07-03 PROCEDURE — 25010000002 CALCIUM CHLORIDE 10 % SOLUTION: Performed by: NURSE ANESTHETIST, CERTIFIED REGISTERED

## 2025-07-03 PROCEDURE — 25810000003 LACTATED RINGERS PER 1000 ML: Performed by: ANESTHESIOLOGY

## 2025-07-03 PROCEDURE — 87116 MYCOBACTERIA CULTURE: CPT | Performed by: INTERNAL MEDICINE

## 2025-07-03 PROCEDURE — 31624 DX BRONCHOSCOPE/LAVAGE: CPT | Performed by: INTERNAL MEDICINE

## 2025-07-03 PROCEDURE — 87220 TISSUE EXAM FOR FUNGI: CPT | Performed by: INTERNAL MEDICINE

## 2025-07-03 RX ORDER — HYDROCODONE BITARTRATE AND ACETAMINOPHEN 5; 325 MG/1; MG/1
1 TABLET ORAL EVERY 4 HOURS PRN
Status: DISCONTINUED | OUTPATIENT
Start: 2025-07-03 | End: 2025-07-03 | Stop reason: HOSPADM

## 2025-07-03 RX ORDER — NALOXONE HCL 0.4 MG/ML
0.4 VIAL (ML) INJECTION AS NEEDED
Status: DISCONTINUED | OUTPATIENT
Start: 2025-07-03 | End: 2025-07-03 | Stop reason: HOSPADM

## 2025-07-03 RX ORDER — ASPIRIN 81 MG/1
81 TABLET, CHEWABLE ORAL ONCE
Status: COMPLETED | OUTPATIENT
Start: 2025-07-03 | End: 2025-07-03

## 2025-07-03 RX ORDER — PHENYLEPHRINE HCL IN 0.9% NACL 1 MG/10 ML
SYRINGE (ML) INTRAVENOUS AS NEEDED
Status: DISCONTINUED | OUTPATIENT
Start: 2025-07-03 | End: 2025-07-03 | Stop reason: SURG

## 2025-07-03 RX ORDER — ALBUTEROL SULFATE 90 UG/1
INHALANT RESPIRATORY (INHALATION) AS NEEDED
Status: DISCONTINUED | OUTPATIENT
Start: 2025-07-03 | End: 2025-07-03 | Stop reason: SURG

## 2025-07-03 RX ORDER — VECURONIUM BROMIDE 1 MG/ML
INJECTION, POWDER, LYOPHILIZED, FOR SOLUTION INTRAVENOUS AS NEEDED
Status: DISCONTINUED | OUTPATIENT
Start: 2025-07-03 | End: 2025-07-03 | Stop reason: SURG

## 2025-07-03 RX ORDER — SODIUM CHLORIDE 0.9 % (FLUSH) 0.9 %
3 SYRINGE (ML) INJECTION EVERY 12 HOURS SCHEDULED
Status: DISCONTINUED | OUTPATIENT
Start: 2025-07-03 | End: 2025-07-03 | Stop reason: HOSPADM

## 2025-07-03 RX ORDER — HYDROCODONE BITARTRATE AND ACETAMINOPHEN 10; 325 MG/1; MG/1
1 TABLET ORAL EVERY 4 HOURS PRN
Status: DISCONTINUED | OUTPATIENT
Start: 2025-07-03 | End: 2025-07-03 | Stop reason: HOSPADM

## 2025-07-03 RX ORDER — CALCIUM CHLORIDE 100 MG/ML
INJECTION INTRAVENOUS; INTRAVENTRICULAR AS NEEDED
Status: DISCONTINUED | OUTPATIENT
Start: 2025-07-03 | End: 2025-07-03 | Stop reason: SURG

## 2025-07-03 RX ORDER — LIDOCAINE HYDROCHLORIDE 20 MG/ML
INJECTION, SOLUTION EPIDURAL; INFILTRATION; INTRACAUDAL; PERINEURAL AS NEEDED
Status: DISCONTINUED | OUTPATIENT
Start: 2025-07-03 | End: 2025-07-03 | Stop reason: SURG

## 2025-07-03 RX ORDER — SODIUM CHLORIDE 9 MG/ML
40 INJECTION, SOLUTION INTRAVENOUS AS NEEDED
Status: DISCONTINUED | OUTPATIENT
Start: 2025-07-03 | End: 2025-07-03 | Stop reason: HOSPADM

## 2025-07-03 RX ORDER — LIDOCAINE HYDROCHLORIDE 10 MG/ML
0.5 INJECTION, SOLUTION EPIDURAL; INFILTRATION; INTRACAUDAL; PERINEURAL ONCE AS NEEDED
Status: DISCONTINUED | OUTPATIENT
Start: 2025-07-03 | End: 2025-07-03 | Stop reason: HOSPADM

## 2025-07-03 RX ORDER — SODIUM CHLORIDE 0.9 % (FLUSH) 0.9 %
3-10 SYRINGE (ML) INJECTION AS NEEDED
Status: DISCONTINUED | OUTPATIENT
Start: 2025-07-03 | End: 2025-07-03 | Stop reason: HOSPADM

## 2025-07-03 RX ORDER — FENTANYL CITRATE 50 UG/ML
50 INJECTION, SOLUTION INTRAMUSCULAR; INTRAVENOUS
Status: DISCONTINUED | OUTPATIENT
Start: 2025-07-03 | End: 2025-07-03 | Stop reason: HOSPADM

## 2025-07-03 RX ORDER — GLYCOPYRROLATE 0.2 MG/ML
INJECTION INTRAMUSCULAR; INTRAVENOUS AS NEEDED
Status: DISCONTINUED | OUTPATIENT
Start: 2025-07-03 | End: 2025-07-03 | Stop reason: SURG

## 2025-07-03 RX ORDER — LABETALOL HYDROCHLORIDE 5 MG/ML
5 INJECTION, SOLUTION INTRAVENOUS
Status: DISCONTINUED | OUTPATIENT
Start: 2025-07-03 | End: 2025-07-03 | Stop reason: HOSPADM

## 2025-07-03 RX ORDER — FENTANYL CITRATE 50 UG/ML
INJECTION, SOLUTION INTRAMUSCULAR; INTRAVENOUS AS NEEDED
Status: DISCONTINUED | OUTPATIENT
Start: 2025-07-03 | End: 2025-07-03 | Stop reason: SURG

## 2025-07-03 RX ORDER — PROPOFOL 10 MG/ML
VIAL (ML) INTRAVENOUS AS NEEDED
Status: DISCONTINUED | OUTPATIENT
Start: 2025-07-03 | End: 2025-07-03 | Stop reason: SURG

## 2025-07-03 RX ORDER — DEXAMETHASONE SODIUM PHOSPHATE 4 MG/ML
INJECTION, SOLUTION INTRA-ARTICULAR; INTRALESIONAL; INTRAMUSCULAR; INTRAVENOUS; SOFT TISSUE AS NEEDED
Status: DISCONTINUED | OUTPATIENT
Start: 2025-07-03 | End: 2025-07-03 | Stop reason: SURG

## 2025-07-03 RX ORDER — SODIUM CHLORIDE 0.9 % (FLUSH) 0.9 %
3 SYRINGE (ML) INJECTION AS NEEDED
Status: DISCONTINUED | OUTPATIENT
Start: 2025-07-03 | End: 2025-07-03 | Stop reason: HOSPADM

## 2025-07-03 RX ORDER — SODIUM CHLORIDE, SODIUM LACTATE, POTASSIUM CHLORIDE, CALCIUM CHLORIDE 600; 310; 30; 20 MG/100ML; MG/100ML; MG/100ML; MG/100ML
100 INJECTION, SOLUTION INTRAVENOUS CONTINUOUS
Status: DISCONTINUED | OUTPATIENT
Start: 2025-07-03 | End: 2025-07-03 | Stop reason: HOSPADM

## 2025-07-03 RX ORDER — FLUMAZENIL 0.1 MG/ML
0.2 INJECTION INTRAVENOUS AS NEEDED
Status: DISCONTINUED | OUTPATIENT
Start: 2025-07-03 | End: 2025-07-03 | Stop reason: HOSPADM

## 2025-07-03 RX ORDER — ONDANSETRON 2 MG/ML
INJECTION INTRAMUSCULAR; INTRAVENOUS AS NEEDED
Status: DISCONTINUED | OUTPATIENT
Start: 2025-07-03 | End: 2025-07-03 | Stop reason: SURG

## 2025-07-03 RX ORDER — SODIUM CHLORIDE, SODIUM LACTATE, POTASSIUM CHLORIDE, CALCIUM CHLORIDE 600; 310; 30; 20 MG/100ML; MG/100ML; MG/100ML; MG/100ML
1000 INJECTION, SOLUTION INTRAVENOUS CONTINUOUS
Status: DISCONTINUED | OUTPATIENT
Start: 2025-07-03 | End: 2025-07-03 | Stop reason: HOSPADM

## 2025-07-03 RX ORDER — ONDANSETRON 2 MG/ML
4 INJECTION INTRAMUSCULAR; INTRAVENOUS ONCE AS NEEDED
Status: DISCONTINUED | OUTPATIENT
Start: 2025-07-03 | End: 2025-07-03 | Stop reason: HOSPADM

## 2025-07-03 RX ADMIN — SUGAMMADEX 200 MG: 100 INJECTION, SOLUTION INTRAVENOUS at 14:23

## 2025-07-03 RX ADMIN — PROPOFOL 60 MG: 10 INJECTION, EMULSION INTRAVENOUS at 12:57

## 2025-07-03 RX ADMIN — Medication 200 MCG: at 13:35

## 2025-07-03 RX ADMIN — Medication 300 MCG: at 13:41

## 2025-07-03 RX ADMIN — ONDANSETRON 4 MG: 2 INJECTION INTRAMUSCULAR; INTRAVENOUS at 13:02

## 2025-07-03 RX ADMIN — DEXAMETHASONE SODIUM PHOSPHATE 4 MG: 4 INJECTION, SOLUTION INTRA-ARTICULAR; INTRALESIONAL; INTRAMUSCULAR; INTRAVENOUS; SOFT TISSUE at 14:11

## 2025-07-03 RX ADMIN — ASPIRIN 81 MG CHEWABLE TABLET 81 MG: 81 TABLET CHEWABLE at 12:44

## 2025-07-03 RX ADMIN — VECURONIUM BROMIDE 5 MG: 10 INJECTION, POWDER, LYOPHILIZED, FOR SOLUTION INTRAVENOUS at 12:57

## 2025-07-03 RX ADMIN — ALBUTEROL SULFATE 1 PUFF: 108 AEROSOL, METERED RESPIRATORY (INHALATION) at 14:20

## 2025-07-03 RX ADMIN — CALCIUM CHLORIDE 0.5 G: 100 INJECTION INTRAVENOUS; INTRAVENTRICULAR at 13:02

## 2025-07-03 RX ADMIN — Medication 300 MCG: at 13:44

## 2025-07-03 RX ADMIN — FENTANYL CITRATE 100 MCG: 50 INJECTION, SOLUTION INTRAMUSCULAR; INTRAVENOUS at 12:55

## 2025-07-03 RX ADMIN — Medication 200 MCG: at 13:12

## 2025-07-03 RX ADMIN — Medication 200 MCG: at 13:17

## 2025-07-03 RX ADMIN — Medication 300 MCG: at 14:32

## 2025-07-03 RX ADMIN — Medication 200 MCG: at 13:15

## 2025-07-03 RX ADMIN — Medication 200 MCG: at 13:22

## 2025-07-03 RX ADMIN — SODIUM CHLORIDE, POTASSIUM CHLORIDE, SODIUM LACTATE AND CALCIUM CHLORIDE 1000 ML: 600; 310; 30; 20 INJECTION, SOLUTION INTRAVENOUS at 11:43

## 2025-07-03 RX ADMIN — LIDOCAINE HYDROCHLORIDE 100 MG: 20 INJECTION, SOLUTION EPIDURAL; INFILTRATION; INTRACAUDAL; PERINEURAL at 12:55

## 2025-07-03 RX ADMIN — GLYCOPYRROLATE 0.2 MG: 0.2 INJECTION INTRAMUSCULAR; INTRAVENOUS at 13:02

## 2025-07-03 NOTE — ANESTHESIA PREPROCEDURE EVALUATION
Anesthesia Evaluation     Patient summary reviewed   no history of anesthetic complications:   NPO Solid Status: > 8 hours             Airway   Mallampati: I  Dental    (+) edentulous    Pulmonary    (+) COPD,home oxygen, shortness of breath    ROS comment: Lung nodule  Cardiovascular   Exercise tolerance: poor (<4 METS)    ECG reviewed    (+) pacemaker ( 98%, medtronic) pacemaker, ICD, hypertension, CAD, cardiac stents , CHF Systolic <55%, PVD, hyperlipidemia      Neuro/Psych  GI/Hepatic/Renal/Endo    (+) renal disease- CRI  (-) liver disease, diabetes    Musculoskeletal     Abdominal    Substance History      OB/GYN          Other                      Anesthesia Plan    ASA 4     general     (Magnet to defib, interrogate after. 5 lead EKG, ASA in preop)  intravenous induction     Anesthetic plan, risks, benefits, and alternatives have been provided, discussed and informed consent has been obtained with: patient.    CODE STATUS:

## 2025-07-03 NOTE — ANESTHESIA PROCEDURE NOTES
Airway  Reason: elective    Date/Time: 7/3/2025 12:58 PM  Airway not difficult    General Information and Staff    Patient location during procedure: OR  CRNA/CAA: Nathan Garcia CRNA    Indications and Patient Condition  Indications for airway management: airway protection    Preoxygenated: yes  MILS maintained throughout    Mask difficulty assessment: 1 - vent by mask    Final Airway Details    Final airway type: endotracheal airway      Successful airway: ETT  Cuffed: yes   Successful intubation technique: direct laryngoscopy  Endotracheal tube insertion site: oral  Blade: [place]  Blade size: 2  ETT size (mm): 8.0  Cormack-Lehane Classification: grade I - full view of glottis  Placement verified by: chest auscultation and capnometry   Cuff volume (mL): 5  Measured from: lips  ETT/EBT  to lips (cm): 20  Number of attempts at approach: 1  Assessment: lips, teeth, and gum same as pre-op and atraumatic intubation

## 2025-07-03 NOTE — OP NOTE
Ion Bronchoscopy and Endobronchial Ultrasound  Procedure Note    Patient Name: Carloz Aldridge  Patient : 1950  Date of Operation: 25  Pre-op Diagnosis: Right upper lobe nodule  Post-op Diagnosis: Same  Anesthesia: General anesthesia per anesthesia record    Indications for Procedure:   The patient is a 74 y.o.  male with right upper lobe nodule.  The risks, benefits, complications, treatment options and expected outcomes were discussed with the patient.  The possibilities of reaction to medication, pulmonary aspiration, perforation of a viscus, bleeding, failure to diagnose a condition and creating a complication requiring transfusion or operation were discussed with the patient who freely signed the consent.  Time out was taken prior to the procedure.    Procedure Performed:  Ion robotic shape sensing navigational bronchoscopy with transbronchial needle aspirate of right upper lobe nodule with navigational, radial probe endobronchial ultrasound, and fluoroscopic guidance  Flexible video bronchoscopy with endobronchial ultrasound (EBUS) guided transbronchial needle aspiration of lymph nodes in stations 11L, 7, 4R, 11R, 11Ri  Flexible diagnostic fiberoptic bronchoscopy with bronchoalveolar lavage of the right upper lobe    Bronchoscopist(s):  Ibrahima Rodriges DO    Equipment Used:  Intuitive Ion Robotic Bronchoscope  Olympus [id number] EBUS Bronchoscope  Olympus [id number] Bronchoscope    Ventilation:   Controlled mechanical ventilation utilizing endotracheal tube    Specimens Collected:   Transbronchial needle aspirate and forceps biopsy of right upper lobe nodule sent for cytopathology and histopathology  Transbronchial needle aspirate of lymph nodes in stations 11L, 7, 4R, 11R, 11Ri sent for cytopathology   Bronchoalveolar lavage of right upper lobe sent for bacterial stain/culture, fungal stain/culture, AFB stain/culture, cell count, cytopathology    Complications:   None    Estimated Blood  Loss:  Less than 10cc    Procedure in Detail:    Survey Portion  The procedure risks, benefits, and alternatives were discussed with the patient and/or family member(s). The patient understood and informed consent was obtained. Laboratory studies and radiographs were reviewed. A time-out was performed. Following induction of general anesthesia, the patient was ventilated through endotracheal tube. The video bronchoscope was introduced through the ET tube. The visualized?trachea appeared normal and the main jevon was sharp. The right and left bronchial trees were visualized to the segmental level. Inspection showed moderate mucus which was suctioned.?  The conventional bronchoscope was removed after the collection of : bronchoalveolar lavage.      Ion Navigational Portion  Using the planning laptop and Planpoint software, the lesion of interest was identified, and marked, a pathway was generated, and anatomic borders were identified.The Ion system was magnetically docked to the ET tube. The shape sensing catheter with vision probe was introduced via the  into the ET tube.    Registration was started by advancing the bronchoscope into the right and left mainstem bronchi. The main jevon was confirmed by rotating the live-view image to match the navigation-view image of the main jevon. Registration was completed by advancing the catheter into the identified the lobar airway. There was minimal visual divergence. Navigation was complicated by : endobronchial mucous impairing visualization. Using the , the shape sensing bronchoscope was advanced to the target lesion. The mobile CMP.LY cone beam C-Arm was brought in, and the vision probe was removed. A peripheral radial ultrasound probe was utilized to better localize the lesion in the right upper lobe posterior segment, giving a concentric view. A needle was inserted into the channel and advanced into the nodule.  A cone beam C arm spin was performed  and confirmed tool in lesion.    Transbronchial needle aspirations of the target lesion were performed using an Intuitive Flexision 21 gauge needle and sent for routine cytopathology. The procedure was guided by fluoroscopy and radial ultrasound. Transbronchial needle aspiration technique was selected because the sampling site was not accessible using standard bronchoscopic techniques. 10 needle aspirations were obtained. Rapid on-site evaluation showed adequate sample.    Transbronchial biopsies of the target lesion were performed using a Cook DBF-1.8-S Captura spikeless forceps and sent for histopathology. The procedure was guided by fluoroscopy and radial ultrasound. Transbronchial biopsy technique was selected because the sampling site was not visible endoscopically. 10 biopsy samples were obtained. Bronchioalveolar lavage was then performed. 10 ml of iced saline was instilled through the shape sensing catheter. The catheter was connected to the suction syringe and the catheter was retracted slowly, producing an aspiration of 4 ml fluid which was clear. The vision probe was reintruced to examine the biopsied area, and then the vision probe, and shape sensing catheter was extracted.      EBUS Portion  After the Ion shape sensing catheter was extracted, the EBUS scope was inserted into the airway without difficulty.    Endobronchial ultrasound was used to examine, measure, and guide the sampling of lymph nodes as follows:  1. Station 11L. The largest lymph node met sampling size criteria. The node was isoechoic and the capsule was intact. Under EBUS guidance, 3 transbronchial needle aspirates were performed at this station. This lymph node was PET negative. Rapid onsite cytology demonstrated the presence of lymphocytes.    2. Station 4L. The node was isoechoic and the capsule was intact.This lymph node was not sampled due to not meeting size criteria.   3. Station 7. The largest lymph node met sampling size  criteria. The node was isoechoic and the capsule was intact. Under EBUS guidance, 3 transbronchial needle aspirates were performed at this station. This lymph node was PET negative. Rapid onsite cytology demonstrated the presence of lymphocytes.   4. Station 4R. The largest lymph node met sampling size criteria. The node was isoechoic and the capsule was intact. Under EBUS guidance, 3 transbronchial needle aspirates were performed at this station. This lymph node was PET negative. Rapid onsite cytology demonstrated the presence of lymphocytes.   5. Station 10R. The node was isoechoic and the capsule was intact.This lymph node was not sampled due to not meeting size criteria.   6. Station 11R. The largest lymph node met sampling size criteria. The node was isoechoic and the capsule was intact. Under EBUS guidance, 3 transbronchial needle aspirates were performed at this station. This lymph node was PET negative. Rapid onsite cytology demonstrated the presence of lymphocytes.   7. Station 11Ri. The largest lymph node met sampling size criteria. The node was isoechoic and the capsule was intact. Under EBUS guidance, 3 transbronchial needle aspirates were performed at this station. This lymph node was PET negative. Rapid onsite cytology demonstrated the presence of lymphocytes.     At this point, the EBUS scope was withdrawn from the airway and the conventional flexible video bronchoscope was reinserted.?A second survey of the accessible airway was performed to ensure hemostasis, which was adequate.  The conventional flexible video bronchoscope was removed and the ET tube undocked from the Ion system.  The patient was recovered under the direction of the anesthesiologist and transported to the recovery area. The results of the procedure will be discussed with the patient and appropriate follow up will be arranged.       Ibrahima Rodriges DO  Pulmonary/Critical Care Medicine  14:35 CDT  7/3/2025

## 2025-07-03 NOTE — ANESTHESIA POSTPROCEDURE EVALUATION
Patient: Carloz Aldridge    Procedure Summary       Date: 07/03/25 Room / Location:  [facility] OR 08 /  [facility]    Anesthesia Start: 1255 Anesthesia Stop: 1442    Procedure: BRONCHOSCOPY NAVIGATION WITH ENDOBRONCHIAL ULTRASOUND AND ION ROBOT (Bronchus) Diagnosis:       Pulmonary nodule      Tobacco use      (Pulmonary nodule [R91.1])      (Tobacco use [Z72.0])    Surgeons: Ibrahima Rodriges DO Provider: Shashi Collins CRNA    Anesthesia Type: general ASA Status: 4            Anesthesia Type: general    Vitals  Vitals Value Taken Time   /51 07/03/25 15:12   Temp 97.8 °F (36.6 °C) 07/03/25 15:20   Pulse 85 07/03/25 15:21   Resp 15 07/03/25 15:20   SpO2 98 % 07/03/25 15:21   Vitals shown include unfiled device data.        Post Anesthesia Care and Evaluation    Patient location during evaluation: PACU  Patient participation: complete - patient participated  Level of consciousness: awake and alert  Pain management: adequate    Airway patency: patent  Anesthetic complications: No anesthetic complications  PONV Status: none  Cardiovascular status: acceptable and hemodynamically stable  Respiratory status: acceptable  Hydration status: acceptable    Comments: Blood pressure 101/52, pulse 83, temperature 97.8 °F (36.6 °C), temperature source Temporal, resp. rate 16, SpO2 96%.    Patient discharged from PACU based upon Haley score. Please see RN notes for further details

## 2025-07-04 LAB
QT INTERVAL: 480 MS
QTC INTERVAL: 528 MS

## 2025-07-05 LAB
NIGHT BLUE STAIN TISS: NORMAL

## 2025-07-07 LAB — FUNGUS WND CULT: ABNORMAL

## 2025-07-08 LAB
BEAKER LAB AP INTRAOPERATIVE CONSULTATION: NORMAL
CYTO UR: NORMAL
LAB AP CASE REPORT: NORMAL
LAB AP CLINICAL INFORMATION: NORMAL
LAB AP SPECIAL STAINS: NORMAL
Lab: NORMAL
PATH REPORT.FINAL DX SPEC: NORMAL
PATH REPORT.GROSS SPEC: NORMAL

## 2025-07-10 LAB
FUNGUS WND CULT: NORMAL
MYCOBACTERIUM SPEC CULT: NORMAL
MYCOBACTERIUM SPEC CULT: NORMAL
NIGHT BLUE STAIN TISS: NORMAL

## 2025-07-17 ENCOUNTER — OFFICE VISIT (OUTPATIENT)
Dept: PULMONOLOGY | Facility: CLINIC | Age: 75
End: 2025-07-17
Payer: MEDICARE

## 2025-07-17 VITALS
BODY MASS INDEX: 18.65 KG/M2 | OXYGEN SATURATION: 89 % | HEIGHT: 75 IN | WEIGHT: 150 LBS | RESPIRATION RATE: 18 BRPM | SYSTOLIC BLOOD PRESSURE: 110 MMHG | HEART RATE: 68 BPM | DIASTOLIC BLOOD PRESSURE: 60 MMHG

## 2025-07-17 DIAGNOSIS — Z72.0 TOBACCO USE: ICD-10-CM

## 2025-07-17 DIAGNOSIS — R91.1 PULMONARY NODULE: Primary | ICD-10-CM

## 2025-07-17 DIAGNOSIS — J44.9 CHRONIC OBSTRUCTIVE PULMONARY DISEASE, UNSPECIFIED COPD TYPE: ICD-10-CM

## 2025-07-17 DIAGNOSIS — J43.9 PULMONARY EMPHYSEMA, UNSPECIFIED EMPHYSEMA TYPE: ICD-10-CM

## 2025-07-17 NOTE — PROGRESS NOTES
Clinic Note - Follow Up            NAME: Carloz Aldridge  MRN: [id number]  AGE: 74 y.o.            : 1950  PRIMARY CARE PROVIDER: Chintan Matos APRN               Reason for Visit:  Carloz Aldridge presents to clinic today for follow up for the evaluation of pulmonary nodule.    History of Present Illness:  Mr. Alexandre is a 74-year-old male who presents to clinic today as a new patient for evaluation of right upper lobe pulmonary nodule. Past medical history includes COPD, advanced emphysema, chronic hypoxic respiratory failure on 4 L, tobacco use, pulmonary nodule.     Patient was last seen in clinic as a new patient on 2025.  Since that time he underwent a planned bronchoscopy with EBUS on 2025.  He returns to clinic today to further discuss the results.  I did inform the patient that his biopsy was negative for malignant cells.  We also biopsied 5 lymph nodes which were all negative.    In speaking with the patient today I explained I am concerned for a false negative given his positive PET/CT.  While the biopsy is reassuring I still think it would be advantageous to discuss with radiation oncology regarding possible SBT despite negative biopsy given the patient's high risk.    Review of Systems:  A full 12-point review of systems was performed and was negative except as documented in the HPI.            Physical Exam:  General: No acute distress, cooperative.  Head: Atraumatic, normocephalic, normal inspection.  Eyes: EOMI, normal inspection.  ENT: Mucous membranes moist, normal inspection. No thrush.  Heart: RRR, no murmur  Lungs: Diminished, mild wheezing  MSK: No edema or clubbing  GI/abdominal: Soft, nontender.  Neurologic: Alert, oriented.  Cranial nerves II through XII grossly intact.      Imaging Review:  No new chest imaging for review.      Pulmonary Functions Testing Results:  None available for review.            Problem List:  Problem List Items Addressed This Visit        Tobacco use    COPD (chronic obstructive pulmonary disease)    Pulmonary nodule - Primary         Pulmonary Assessment:  Patient is a 74-year-old male who presents to clinic today for follow-up of the above problems.  We again reviewed his biopsy results in clinic.  His nodule in 5 lymph nodes were negative for malignant cells.  The nodule did show necrotic debris, acute inflammation, scant metaplastic squamous mucosa exhibiting reactive and reparative changes.  Notably this nodule was PET avid.  I explained given these findings I am concerned for a false negative and would like to discuss further with radiation oncology.  The patient was agreeable to this plan.      Plan:   - Reviewed pathology results and PET/CT with radiation oncology concerning proceeding with empiric radiation despite negative biopsy  - Will call the patient after I am able to discuss with radiation oncology           Follow Up:  3 weeks         Thank you Chintan Matos APRN for allowing me to participate in this patient's care.           Past Medical History:   Past Medical History:   Diagnosis Date    Arthritis     CAD in native artery     Cardiomyopathy     Story: 9/14- EF 20-25% WORSENED LVEF AFTER COMING OFF MEDS (SELF-DIRECTED) 2/14-LVEF BELOW 35%; 4/2015 EF 30% Pt had an AICD in the past, which was removed due to infection.    Chronic systolic heart failure     Story: LVEF 30% AICD removed 2011 d/t infection. Per Dr. Canales risk of infection too high to reimplant AICD.    COPD (chronic obstructive pulmonary disease)     Dyspnea, paroxysmal nocturnal     Erectile dysfunction     Hyperlipidemia, mild     Hypertension     Non compliance with medical treatment     Oxygen dependent     Tobacco use disorder     V tach          Past Surgical History:   Past Surgical History:   Procedure Laterality Date    BRONCHOSCOPY WITH ION ROBOTIC ASSIST N/A 7/3/2025    Procedure: BRONCHOSCOPY NAVIGATION WITH ENDOBRONCHIAL ULTRASOUND AND ION ROBOT;   Surgeon: Ibrahima Rodriges DO;  Location:  [facility] OR;  Service: Robotics - Pulmonary;  Laterality: N/A;  pre op: Pulmonary nodule RUL  post op: RUL pulmonary nodule  pcp: Chintan Matos APRN    CARDIAC CATHETERIZATION Right     CATARACT EXTRACTION Bilateral     CERVICAL DISCECTOMY ANTERIOR      C6 and 7    CORONARY STENT PLACEMENT      EYE SURGERY      HERNIA REPAIR      x3    MOUTH SURGERY      OTHER SURGICAL HISTORY      laser surgery    PACEMAKER IMPLANTATION      pacemaker / d-fib    TOE SURGERY Bilateral     great toe    TONSILLECTOMY      as chile    VASECTOMY           Family History:   Family History   Problem Relation Age of Onset    Heart disease Mother     Hypertension Mother     Cancer Father     Heart disease Father     Hypertension Father          Medications:   Prior to Admission medications    Medication Sig Start Date End Date Taking? Authorizing Provider   albuterol sulfate HFA (Ventolin HFA) 108 (90 Base) MCG/ACT inhaler Inhale 2 puffs Every 4 (Four) Hours As Needed for Wheezing.   Yes Alvaro Alvarado MD   aspirin 81 MG tablet Take  by mouth daily.   Yes Alvaro Alvarado MD   atorvastatin (LIPITOR) 40 MG tablet Take 1 tablet by mouth Daily. 10/8/21  Yes Yojana Corrales APRN   Budeson-Glycopyrrol-Formoterol (Breztri Aerosphere) 160-9-4.8 MCG/ACT aerosol inhaler Inhale 2 puffs 2 (Two) Times a Day.   Yes Alvaro Alvarado MD   carvedilol (COREG) 6.25 MG tablet TAKE 1 TABLET BY MOUTH TWICE A DAY 7/6/22  Yes Bradford Camargo APRN   Cholecalciferol (Vitamin D-3) 125 MCG (5000 UT) tablet Take  by mouth.   Yes Alvaro Alvarado MD   dapagliflozin Propanediol (Farxiga) 10 MG tablet Take  by mouth.   Yes Alvaro Alvarado MD   Diphenhydramine-APAP, sleep, (TYLENOL PM EXTRA STRENGTH PO) Take  by mouth as needed.   Yes Alvaro Alvarado MD   furosemide (LASIX) 20 MG tablet Take 1 tablet by mouth 2 (Two) Times a Day.   Yes Alvaro Alvarado MD   mometasone (NASONEX)  "50 MCG/ACT nasal spray INSTILL 2 SPRAYS INTO EACH NOSTRIL TWICE A DAY  Patient taking differently: As Needed. 8/13/16  Yes Alvaro Alvarado MD   multivitamin (MULTI-DAY PO) Take  by mouth Daily.   Yes Alvaro Alvarado MD   Naproxen Sodium (ALEVE PO) Take 200 mg by mouth As Needed.   Yes Alvaro Alvarado MD   nitroglycerin (NITROSTAT) 0.4 MG SL tablet Place 1 tablet under the tongue Every 5 (Five) Minutes As Needed for Chest Pain. Take no more than 3 doses in 15 minutes. 10/8/21  Yes Yojana Corrales APRN   spironolactone (ALDACTONE) 25 MG tablet TAKE 1 TABLET BY MOUTH EVERY DAY 12/8/21  Yes Yojana Corrales APRN   tamsulosin (FLOMAX) 0.4 MG capsule 24 hr capsule Take 1 capsule by mouth Daily.   Yes Alvaro Alvarado MD   lisinopril (PRINIVIL,ZESTRIL) 5 MG tablet TAKE 1 TABLET BY MOUTH EVERY DAY  Patient not taking: Reported on 6/26/2025 2/14/22   Yojana Corrales APRN   PROAIR RESPICLICK 108 (90 BASE) MCG/ACT inhaler TAKE 2 PUFFS EVERY 4 HOURS  Patient not taking: Reported on 7/17/2025 8/13/16   Alvaro Alvarado MD   sacubitril-valsartan (Entresto) 24-26 MG tablet Take 1 tablet by mouth 2 (Two) Times a Day.  Patient not taking: Reported on 7/17/2025    Alvaro Alvarado MD         Allergies:   Patient has no known allergies.      Results Review:             Visit Vitals  /60   Pulse 68   Resp 18   Ht 190 cm (74.8\")   Wt 68 kg (150 lb)   SpO2 (!) 89% Comment: 4 liter   BMI 18.85 kg/m²           Social History:     Social History     Socioeconomic History    Marital status:    Tobacco Use    Smoking status: Every Day     Current packs/day: 0.50     Types: Cigarettes     Passive exposure: Current    Smokeless tobacco: Former    Tobacco comments:     Has tried unsuccessfully in the past to quit- 6/26/25 smoking 0.5 daily    Vaping Use    Vaping status: Never Used   Substance and Sexual Activity    Alcohol use: No    Drug use: No    Sexual activity: Defer                Brentley " Antelmo BANEGAS  Pulmonary/Critical Care  7/17/2025  16:09 CDT

## 2025-07-29 ENCOUNTER — TELEPHONE (OUTPATIENT)
Dept: PULMONOLOGY | Facility: CLINIC | Age: 75
End: 2025-07-29
Payer: MEDICARE

## 2025-07-29 NOTE — TELEPHONE ENCOUNTER
Per Dr Rodriges: will you please call this pt and let him know I have not forgotten to call him. once I have a plan with radiation onc I will call him and discuss, thanks     Patient has an appointment on Monday 08/04/2025 to come see you and plans to come to that appointment.

## 2025-07-30 ENCOUNTER — TELEPHONE (OUTPATIENT)
Dept: PULMONOLOGY | Facility: CLINIC | Age: 75
End: 2025-07-30
Payer: MEDICARE

## 2025-07-30 DIAGNOSIS — R91.1 PULMONARY NODULE: Primary | ICD-10-CM

## 2025-07-30 NOTE — TELEPHONE ENCOUNTER
Per provider patient appointment was rescheduled from 8/4/25@130 to 8/28/25 @ 11:15. Per provider wants appointment pushed 4 weeks out. Patient is aware of appointment time and date.

## 2025-08-04 RX ORDER — TAMSULOSIN HYDROCHLORIDE 0.4 MG/1
1 CAPSULE ORAL DAILY
Qty: 90 CAPSULE | Refills: 1 | Status: SHIPPED | OUTPATIENT
Start: 2025-08-04

## 2025-08-04 RX ORDER — TAMSULOSIN HYDROCHLORIDE 0.4 MG/1
1 CAPSULE ORAL DAILY
Qty: 30 CAPSULE | Refills: 3 | Status: SHIPPED | OUTPATIENT
Start: 2025-08-04 | End: 2025-08-04

## 2025-08-18 LAB — FUNGUS WND CULT: ABNORMAL

## (undated) DEVICE — KT ASP VIZISHOT 5SYRG 5BIOPSY/VLV 22G

## (undated) DEVICE — TRAP,MUCUS SPECIMEN, 80CC: Brand: MEDLINE

## (undated) DEVICE — SINGLE USE BIOPSY VALVE MAJ-210: Brand: SINGLE USE BIOPSY VALVE (STERILE)

## (undated) DEVICE — SWIVEL CONNECTOR

## (undated) DEVICE — THE CHANNEL CLEANING BRUSH IS A NYLON FLEXI BRUSH ATTACHED TO A FLEXIBLE PLASTIC SHEATH DESIGNED TO SAFELY REMOVE DEBRIS FROM FLEXIBLE ENDOSCOPES.

## (undated) DEVICE — Device: Brand: ION

## (undated) DEVICE — SENSR O2 OXIMAX FNGR A/ 18IN NONSTR

## (undated) DEVICE — ADAPT SWVL FIBROPTIC BRONCH

## (undated) DEVICE — TBG SMPL FLTR LINE NASL 02/C02 A/ BX/100

## (undated) DEVICE — Device: Brand: BALLOON

## (undated) DEVICE — FRCP BIOP SUPERDIMENSION PREMARK

## (undated) DEVICE — VISION PROBE ADAPTER AND SUCTION ADAPTER

## (undated) DEVICE — BIOPSY NEEDLE, 21G: Brand: FLEXISION

## (undated) DEVICE — ORAL ENDOTRACHEAL TUBE FASTENER: Brand: ANCHORFAST

## (undated) DEVICE — TBG PRESS EXT

## (undated) DEVICE — SINGLE USE SUCTION VALVE MAJ-209: Brand: SINGLE USE SUCTION VALVE (STERILE)